# Patient Record
Sex: FEMALE | Race: WHITE | NOT HISPANIC OR LATINO | URBAN - METROPOLITAN AREA
[De-identification: names, ages, dates, MRNs, and addresses within clinical notes are randomized per-mention and may not be internally consistent; named-entity substitution may affect disease eponyms.]

---

## 2019-02-26 PROCEDURE — 99284 EMERGENCY DEPT VISIT MOD MDM: CPT | Mod: 25

## 2019-02-27 ENCOUNTER — OUTPATIENT (OUTPATIENT)
Dept: OUTPATIENT SERVICES | Facility: HOSPITAL | Age: 64
LOS: 1 days | End: 2019-02-27

## 2019-02-27 ENCOUNTER — INPATIENT (INPATIENT)
Facility: HOSPITAL | Age: 64
LOS: 1 days | Discharge: HOME | End: 2019-03-01
Payer: MEDICAID

## 2019-02-27 PROCEDURE — 71045 X-RAY EXAM CHEST 1 VIEW: CPT | Mod: 26

## 2019-02-28 ENCOUNTER — OUTPATIENT (OUTPATIENT)
Dept: OUTPATIENT SERVICES | Facility: HOSPITAL | Age: 64
LOS: 1 days | End: 2019-02-28

## 2019-03-01 ENCOUNTER — OUTPATIENT (OUTPATIENT)
Dept: OUTPATIENT SERVICES | Facility: HOSPITAL | Age: 64
LOS: 1 days | End: 2019-03-01

## 2019-03-01 PROCEDURE — 76770 US EXAM ABDO BACK WALL COMP: CPT | Mod: 26

## 2019-06-19 ENCOUNTER — INPATIENT (INPATIENT)
Facility: HOSPITAL | Age: 64
LOS: 1 days | Discharge: HOME | End: 2019-06-21
Attending: FAMILY MEDICINE
Payer: COMMERCIAL

## 2019-06-19 ENCOUNTER — OUTPATIENT (OUTPATIENT)
Dept: OUTPATIENT SERVICES | Facility: HOSPITAL | Age: 64
LOS: 1 days | End: 2019-06-19

## 2019-06-19 PROCEDURE — 99285 EMERGENCY DEPT VISIT HI MDM: CPT

## 2019-06-20 ENCOUNTER — OUTPATIENT (OUTPATIENT)
Dept: OUTPATIENT SERVICES | Facility: HOSPITAL | Age: 64
LOS: 1 days | End: 2019-06-20

## 2019-06-21 ENCOUNTER — OUTPATIENT (OUTPATIENT)
Dept: OUTPATIENT SERVICES | Facility: HOSPITAL | Age: 64
LOS: 1 days | End: 2019-06-21

## 2019-10-04 ENCOUNTER — EMERGENCY (EMERGENCY)
Facility: HOSPITAL | Age: 64
LOS: 1 days | End: 2019-10-04
Admitting: EMERGENCY MEDICINE
Payer: MEDICAID

## 2019-10-04 PROCEDURE — 99284 EMERGENCY DEPT VISIT MOD MDM: CPT

## 2019-10-07 PROCEDURE — 93010 ELECTROCARDIOGRAM REPORT: CPT

## 2019-10-14 ENCOUNTER — INPATIENT (INPATIENT)
Facility: HOSPITAL | Age: 64
LOS: 1 days | Discharge: PSYCHIATRIC OR SUBSTANCE USE FACILITY | End: 2019-10-16
Payer: MEDICAID

## 2019-10-14 ENCOUNTER — OUTPATIENT (OUTPATIENT)
Dept: OUTPATIENT SERVICES | Facility: HOSPITAL | Age: 64
LOS: 1 days | End: 2019-10-14

## 2019-10-14 PROCEDURE — 71045 X-RAY EXAM CHEST 1 VIEW: CPT | Mod: 26

## 2019-10-14 PROCEDURE — 99285 EMERGENCY DEPT VISIT HI MDM: CPT

## 2019-10-15 ENCOUNTER — OUTPATIENT (OUTPATIENT)
Dept: OUTPATIENT SERVICES | Facility: HOSPITAL | Age: 64
LOS: 1 days | End: 2019-10-15

## 2019-10-15 PROCEDURE — 93010 ELECTROCARDIOGRAM REPORT: CPT

## 2019-10-16 ENCOUNTER — INPATIENT (INPATIENT)
Facility: HOSPITAL | Age: 64
LOS: 22 days | Discharge: HOME | End: 2019-11-08
Attending: PSYCHIATRY & NEUROLOGY | Admitting: PSYCHIATRY & NEUROLOGY
Payer: MEDICAID

## 2019-10-16 ENCOUNTER — OUTPATIENT (OUTPATIENT)
Dept: OUTPATIENT SERVICES | Facility: HOSPITAL | Age: 64
LOS: 1 days | End: 2019-10-16

## 2019-10-16 VITALS — HEIGHT: 59 IN | RESPIRATION RATE: 16 BRPM | WEIGHT: 162.26 LBS | TEMPERATURE: 98 F

## 2019-10-16 DIAGNOSIS — F33.2 MAJOR DEPRESSIVE DISORDER, RECURRENT SEVERE WITHOUT PSYCHOTIC FEATURES: ICD-10-CM

## 2019-10-16 PROCEDURE — 99221 1ST HOSP IP/OBS SF/LOW 40: CPT

## 2019-10-16 RX ORDER — ATORVASTATIN CALCIUM 80 MG/1
20 TABLET, FILM COATED ORAL AT BEDTIME
Refills: 0 | Status: DISCONTINUED | OUTPATIENT
Start: 2019-10-16 | End: 2019-11-08

## 2019-10-16 RX ORDER — INSULIN GLARGINE 100 [IU]/ML
20 INJECTION, SOLUTION SUBCUTANEOUS AT BEDTIME
Refills: 0 | Status: DISCONTINUED | OUTPATIENT
Start: 2019-10-16 | End: 2019-10-18

## 2019-10-16 RX ORDER — INSULIN LISPRO 100/ML
VIAL (ML) SUBCUTANEOUS AT BEDTIME
Refills: 0 | Status: DISCONTINUED | OUTPATIENT
Start: 2019-10-16 | End: 2019-11-08

## 2019-10-16 RX ORDER — VENLAFAXINE HCL 75 MG
37.5 CAPSULE, EXT RELEASE 24 HR ORAL DAILY
Refills: 0 | Status: DISCONTINUED | OUTPATIENT
Start: 2019-10-16 | End: 2019-10-20

## 2019-10-16 RX ORDER — INSULIN GLARGINE 100 [IU]/ML
20 INJECTION, SOLUTION SUBCUTANEOUS AT BEDTIME
Refills: 0 | Status: DISCONTINUED | OUTPATIENT
Start: 2019-10-16 | End: 2019-10-16

## 2019-10-16 RX ORDER — LISINOPRIL 2.5 MG/1
5 TABLET ORAL DAILY
Refills: 0 | Status: DISCONTINUED | OUTPATIENT
Start: 2019-10-16 | End: 2019-10-17

## 2019-10-16 RX ORDER — INSULIN LISPRO 100/ML
VIAL (ML) SUBCUTANEOUS
Refills: 0 | Status: DISCONTINUED | OUTPATIENT
Start: 2019-10-16 | End: 2019-11-08

## 2019-10-16 RX ORDER — VENLAFAXINE HCL 75 MG
37.5 CAPSULE, EXT RELEASE 24 HR ORAL DAILY
Refills: 0 | Status: DISCONTINUED | OUTPATIENT
Start: 2019-10-16 | End: 2019-10-16

## 2019-10-16 RX ORDER — MONTELUKAST 4 MG/1
10 TABLET, CHEWABLE ORAL DAILY
Refills: 0 | Status: DISCONTINUED | OUTPATIENT
Start: 2019-10-16 | End: 2019-11-08

## 2019-10-16 RX ADMIN — INSULIN GLARGINE 20 UNIT(S): 100 INJECTION, SOLUTION SUBCUTANEOUS at 21:18

## 2019-10-16 RX ADMIN — ATORVASTATIN CALCIUM 20 MILLIGRAM(S): 80 TABLET, FILM COATED ORAL at 21:18

## 2019-10-16 RX ADMIN — Medication 2: at 21:18

## 2019-10-17 ENCOUNTER — EMERGENCY (EMERGENCY)
Facility: HOSPITAL | Age: 64
LOS: 1 days | Discharge: HOME | End: 2019-10-17
Attending: EMERGENCY MEDICINE | Admitting: EMERGENCY MEDICINE
Payer: MEDICAID

## 2019-10-17 VITALS
HEART RATE: 85 BPM | SYSTOLIC BLOOD PRESSURE: 124 MMHG | TEMPERATURE: 98 F | RESPIRATION RATE: 16 BRPM | DIASTOLIC BLOOD PRESSURE: 84 MMHG | OXYGEN SATURATION: 100 %

## 2019-10-17 LAB
ALBUMIN SERPL ELPH-MCNC: 3.4 G/DL — SIGNIFICANT CHANGE UP (ref 3.3–5)
ALP SERPL-CCNC: 106 U/L — SIGNIFICANT CHANGE UP (ref 40–120)
ALT FLD-CCNC: 11 U/L — SIGNIFICANT CHANGE UP (ref 4–33)
ANION GAP SERPL CALC-SCNC: 11 MMO/L — SIGNIFICANT CHANGE UP (ref 7–14)
AST SERPL-CCNC: 23 U/L — SIGNIFICANT CHANGE UP (ref 4–32)
BASOPHILS # BLD AUTO: 0.09 K/UL — SIGNIFICANT CHANGE UP (ref 0–0.2)
BASOPHILS NFR BLD AUTO: 0.7 % — SIGNIFICANT CHANGE UP (ref 0–2)
BILIRUB SERPL-MCNC: < 0.2 MG/DL — LOW (ref 0.2–1.2)
BUN SERPL-MCNC: 27 MG/DL — HIGH (ref 7–23)
CALCIUM SERPL-MCNC: 9 MG/DL — SIGNIFICANT CHANGE UP (ref 8.4–10.5)
CHLORIDE SERPL-SCNC: 103 MMOL/L — SIGNIFICANT CHANGE UP (ref 98–107)
CO2 SERPL-SCNC: 20 MMOL/L — LOW (ref 22–31)
CREAT SERPL-MCNC: 1.74 MG/DL — HIGH (ref 0.5–1.3)
EOSINOPHIL # BLD AUTO: 0.15 K/UL — SIGNIFICANT CHANGE UP (ref 0–0.5)
EOSINOPHIL NFR BLD AUTO: 1.2 % — SIGNIFICANT CHANGE UP (ref 0–6)
FOLATE SERPL-MCNC: 6.1 NG/ML — SIGNIFICANT CHANGE UP (ref 4.7–20)
GLUCOSE SERPL-MCNC: 339 MG/DL — HIGH (ref 70–99)
HBA1C BLD-MCNC: 15.9 % — HIGH (ref 4–5.6)
HCT VFR BLD CALC: 36.9 % — SIGNIFICANT CHANGE UP (ref 34.5–45)
HGB BLD-MCNC: 11.9 G/DL — SIGNIFICANT CHANGE UP (ref 11.5–15.5)
IMM GRANULOCYTES NFR BLD AUTO: 0.6 % — SIGNIFICANT CHANGE UP (ref 0–1.5)
LYMPHOCYTES # BLD AUTO: 2.51 K/UL — SIGNIFICANT CHANGE UP (ref 1–3.3)
LYMPHOCYTES # BLD AUTO: 20.7 % — SIGNIFICANT CHANGE UP (ref 13–44)
MCHC RBC-ENTMCNC: 29.7 PG — SIGNIFICANT CHANGE UP (ref 27–34)
MCHC RBC-ENTMCNC: 32.2 % — SIGNIFICANT CHANGE UP (ref 32–36)
MCV RBC AUTO: 92 FL — SIGNIFICANT CHANGE UP (ref 80–100)
MONOCYTES # BLD AUTO: 0.55 K/UL — SIGNIFICANT CHANGE UP (ref 0–0.9)
MONOCYTES NFR BLD AUTO: 4.5 % — SIGNIFICANT CHANGE UP (ref 2–14)
NEUTROPHILS # BLD AUTO: 8.76 K/UL — HIGH (ref 1.8–7.4)
NEUTROPHILS NFR BLD AUTO: 72.3 % — SIGNIFICANT CHANGE UP (ref 43–77)
NRBC # FLD: 0 K/UL — SIGNIFICANT CHANGE UP (ref 0–0)
PLATELET # BLD AUTO: 390 K/UL — SIGNIFICANT CHANGE UP (ref 150–400)
PMV BLD: 9.1 FL — SIGNIFICANT CHANGE UP (ref 7–13)
POTASSIUM SERPL-MCNC: 4.8 MMOL/L — SIGNIFICANT CHANGE UP (ref 3.5–5.3)
POTASSIUM SERPL-SCNC: 4.8 MMOL/L — SIGNIFICANT CHANGE UP (ref 3.5–5.3)
PROT SERPL-MCNC: 7.7 G/DL — SIGNIFICANT CHANGE UP (ref 6–8.3)
RBC # BLD: 4.01 M/UL — SIGNIFICANT CHANGE UP (ref 3.8–5.2)
RBC # FLD: 15 % — HIGH (ref 10.3–14.5)
SODIUM SERPL-SCNC: 134 MMOL/L — LOW (ref 135–145)
TSH SERPL-MCNC: 1.82 UIU/ML — SIGNIFICANT CHANGE UP (ref 0.27–4.2)
VIT B12 SERPL-MCNC: 806 PG/ML — SIGNIFICANT CHANGE UP (ref 200–900)
WBC # BLD: 12.13 K/UL — HIGH (ref 3.8–10.5)
WBC # FLD AUTO: 12.13 K/UL — HIGH (ref 3.8–10.5)

## 2019-10-17 PROCEDURE — 93010 ELECTROCARDIOGRAM REPORT: CPT

## 2019-10-17 PROCEDURE — 99283 EMERGENCY DEPT VISIT LOW MDM: CPT | Mod: 25

## 2019-10-17 PROCEDURE — 99223 1ST HOSP IP/OBS HIGH 75: CPT

## 2019-10-17 PROCEDURE — 99232 SBSQ HOSP IP/OBS MODERATE 35: CPT

## 2019-10-17 RX ORDER — DEXTROSE 50 % IN WATER 50 %
25 SYRINGE (ML) INTRAVENOUS ONCE
Refills: 0 | Status: DISCONTINUED | OUTPATIENT
Start: 2019-10-17 | End: 2019-11-08

## 2019-10-17 RX ORDER — INSULIN LISPRO 100/ML
6 VIAL (ML) SUBCUTANEOUS
Refills: 0 | Status: DISCONTINUED | OUTPATIENT
Start: 2019-10-17 | End: 2019-10-18

## 2019-10-17 RX ORDER — INSULIN LISPRO 100/ML
6 VIAL (ML) SUBCUTANEOUS ONCE
Refills: 0 | Status: COMPLETED | OUTPATIENT
Start: 2019-10-17 | End: 2019-10-17

## 2019-10-17 RX ORDER — GLUCAGON INJECTION, SOLUTION 0.5 MG/.1ML
1 INJECTION, SOLUTION SUBCUTANEOUS ONCE
Refills: 0 | Status: DISCONTINUED | OUTPATIENT
Start: 2019-10-17 | End: 2019-11-08

## 2019-10-17 RX ORDER — SODIUM CHLORIDE 9 MG/ML
1000 INJECTION INTRAMUSCULAR; INTRAVENOUS; SUBCUTANEOUS ONCE
Refills: 0 | Status: COMPLETED | OUTPATIENT
Start: 2019-10-17 | End: 2019-10-17

## 2019-10-17 RX ADMIN — Medication 37.5 MILLIGRAM(S): at 09:48

## 2019-10-17 RX ADMIN — LISINOPRIL 5 MILLIGRAM(S): 2.5 TABLET ORAL at 09:47

## 2019-10-17 RX ADMIN — MONTELUKAST 10 MILLIGRAM(S): 4 TABLET, CHEWABLE ORAL at 09:48

## 2019-10-17 RX ADMIN — Medication 10: at 16:56

## 2019-10-17 RX ADMIN — Medication 4: at 12:30

## 2019-10-17 RX ADMIN — ATORVASTATIN CALCIUM 20 MILLIGRAM(S): 80 TABLET, FILM COATED ORAL at 20:03

## 2019-10-17 RX ADMIN — Medication 6 UNIT(S): at 16:57

## 2019-10-17 NOTE — ED PROVIDER NOTE - PATIENT PORTAL LINK FT
You can access the FollowMyHealth Patient Portal offered by [facility] by registering at the following website: [email or web address]. By joining [employer]’s FollowMyHealth portal, you will also be able to view your health information using other applications (apps) compatible with our system.

## 2019-10-17 NOTE — CHART NOTE - NSCHARTNOTEFT_GEN_A_CORE
[facility] Inpatient to ED Transfer Summary    Reason for Transfer/Medical Summary: ELEVATED  WITH CHANGE IN MENTAL STATUS  Called to evaluate pt who is a 65 yo woman with PMHx of depression, Type 2 Diabetes, HTN, HLD, CKD stage 3, who was noted to have an elevated FS of 512 and noted to be "sluggish". As per RN, earlier today patient was at baseline, as day progressed she was noted to be sluggish (drooling, eyes closing while speaking, unsteady gait). Pts last FS of 399 was treated with ISS (10units) and pre-meal Humalog (6units). Pt seen and examined. Pt reports having elevated FS in past and admits to noncompliance with medications at home. She is A&Ox2 (person & place) at baseline and during this encounter. As per chart review, patient with history of elevated FS (march 2019) and most recently prior to [facility] admission, pt treated at [facility] for elevated BG of 700's which was treated with 3-4L of IVF and IV Insulin- once medically stable she was cleared for transfer. Patient was seen and medications were adjusted- Pre-Meal insulin added only today, receiving only one dose. Patient denies chills, HA, SOB, CP, abdominal pain, N/V, diarrhea, or urinary symptoms (polyuria, urgency, blood in urine, vaginal discharge).     ANNIE made aware of patient transfer to [facility] ED.     Discussed patient transfer with ED Triage Nurse Keke Aggarwal at [facility] ED.    If any additional questions please feel free to call unit @ [phone] OR the Medicine PA, Nikkie Jacobs pager [phone].      PAST MEDICAL & SURGICAL HISTORY:  Type 2 Diabetes  HTN  HLD  CKD stage 3    ALLERGIES  penicillin (Unknown)    INTOLERANCES    SOCIAL Hx:  Denies any ETOH or recreational drug use  Smokes anywhere from 1-2 packs of cigarettes a day, since the age of 14.   Lives with her sister    MEDICATIONS  (STANDING):  atorvastatin 20 milliGRAM(s) Oral at bedtime  dextrose 50% Injectable 25 Gram(s) IV Push once  insulin glargine Injectable (LANTUS) 20 Unit(s) SubCutaneous at bedtime  insulin lispro (HumaLOG) corrective regimen sliding scale   SubCutaneous three times a day before meals  insulin lispro (HumaLOG) corrective regimen sliding scale   SubCutaneous at bedtime  insulin lispro Injectable (HumaLOG) 6 Unit(s) SubCutaneous three times a day before meals  montelukast 10 milliGRAM(s) Oral daily  venlafaxine XR 37.5 milliGRAM(s) Oral daily    MEDICATIONS  (PRN):  glucagon  Injectable 1 milliGRAM(s) IntraMuscular once PRN Glucose LESS THAN 70 milligrams/deciliter  LORazepam     Tablet 1 milliGRAM(s) Oral every 6 hours PRN Agitation  LORazepam   Injectable 1 milliGRAM(s) IntraMuscular every 6 hours PRN Agitation      Vital Signs Last 24 Hrs  T(C): 36.9 (17 Oct 2019 20:17), Max: 36.9 (17 Oct 2019 20:17)  T(F): 98.4 (17 Oct 2019 20:17), Max: 98.4 (17 Oct 2019 20:17)  HR: 94 (17 Oct 2019 20:17) (94 - 94)  BP: 153/73 (17 Oct 2019 20:17) (153/73 - 153/73)  RR: 20 (17 Oct 2019 20:17) (19 - 20)  CAPILLARY BLOOD GLUCOSE    POCT Blood Glucose.: 491 mg/dL (17 Oct 2019 20:12)  POCT Blood Glucose.: 513 mg/dL (17 Oct 2019 20:09)  POCT Blood Glucose.: 399 mg/dL (17 Oct 2019 16:13)  POCT Blood Glucose.: 248 mg/dL (17 Oct 2019 12:18)  POCT Blood Glucose.: 90 mg/dL (17 Oct 2019 08:49)  POCT Blood Glucose.: 267 mg/dL (16 Oct 2019 20:56)      PHYSICAL EXAM:  GENERAL: overweight elderley woman, NAD, well-developed, well-nourished, sitting comfortably with walker at side in day room with PCA next to her  HEAD:  Atraumatic, Normocephalic  EYES: EOMI, PERRLA, conjunctiva and sclera clear  NECK: Supple, No JVD  CHEST/LUNG: Clear to auscultation on right lung field, crackles heard on left lung field; No wheezes or rhonchi  HEART: Regular rate and rhythm; +s1 and +s2, No murmurs, rubs, or gallops  ABDOMEN: Soft, Nontender, Nondistended; normoactive bowel sounds present throughout  EXTREMITIES:  2+ Peripheral Pulses, No clubbing, cyanosis, or edema  PSYCH: AAOx2 (person, place); sluggish but responsive with questions  NEUROLOGY: non-focal  SKIN: No rashes or lesions    LABS:                        11.9   12.13 )-----------( 390      ( 17 Oct 2019 14:30 )             36.9     10-17    134<L>  |  103  |  27<H>  ----------------------------<  339<H>  4.8   |  20<L>  |  1.74<H>    Ca    9.0      17 Oct 2019 14:30    TPro  7.7  /  Alb  3.4  /  TBili  < 0.2<L>  /  DBili  x   /  AST  23  /  ALT  11  /  AlkPhos  106  10-17      Psychiatry Section:  Psychiatric Summary/[facility] admitting diagnosis: Severe episode of recurrent major depressive disorder, without psychotic features    Psychiatric Recommendations: continue medications as ordered     Observation status (check one):   (X) Constant Observation  ( ) Enhanced care  ( ) Routine checks    Risk Status (check all that apply if present):  ( ) at risk for suicide/self-injury  ( ) at risk for aggressive behavior  (X) at risk for elopement  ( ) other risk: [facility] Inpatient to ED Transfer Summary    Reason for Transfer/Medical Summary: ELEVATED  WITH CHANGE IN MENTAL STATUS  Called to evaluate pt who is a 63 yo woman with PMHx of depression, Type 2 Diabetes, HTN, HLD, CKD stage 3, who was noted to have an elevated FS of 512 and noted to be "sluggish". As per RN, earlier today patient was at baseline, as day progressed she was noted to be sluggish (drooling, eyes closing while speaking, unsteady gait). Pts last FS of 399 was treated with ISS (10units) and pre-meal Humalog (6units). Pt seen and examined. Pt reports having elevated FS in past and admits to noncompliance with medications at home. She is A&Ox2 (person & place) at baseline and during this encounter. As per chart review, patient with history of elevated FS (march 2019) and most recently prior to [facility] admission, pt treated at [facility] for elevated BG of 700's which was treated with 3-4L of IVF and IV Insulin- once medically stable she was cleared for transfer. Patient was seen by hospitalist and medications were adjusted- Pre-Meal insulin added only today, receiving only one dose. Discussed case with Hospitalists, who agree with current plan to transfer patient to ED in setting of change in mental status. Patient denies chills, HA, SOB, CP, abdominal pain, N/V, diarrhea, or urinary symptoms (polyuria, urgency, blood in urine, vaginal discharge).     ANNIE made aware of patient transfer to [facility] ED.     Discussed patient transfer with ED Triage Nurse Keke Aggarwal at [facility] ED.    If any additional questions please feel free to call unit @ [phone] OR the Medicine PA, Nikkie Jacobs pager [phone].      PAST MEDICAL & SURGICAL HISTORY:  Type 2 Diabetes  HTN  HLD  CKD stage 3    ALLERGIES  penicillin (Unknown)    INTOLERANCES    SOCIAL Hx:  Denies any ETOH or recreational drug use  Smokes anywhere from 1-2 packs of cigarettes a day, since the age of 14.   Lives with her sister    MEDICATIONS  (STANDING):  atorvastatin 20 milliGRAM(s) Oral at bedtime  dextrose 50% Injectable 25 Gram(s) IV Push once  insulin glargine Injectable (LANTUS) 20 Unit(s) SubCutaneous at bedtime  insulin lispro (HumaLOG) corrective regimen sliding scale   SubCutaneous three times a day before meals  insulin lispro (HumaLOG) corrective regimen sliding scale   SubCutaneous at bedtime  insulin lispro Injectable (HumaLOG) 6 Unit(s) SubCutaneous three times a day before meals  montelukast 10 milliGRAM(s) Oral daily  venlafaxine XR 37.5 milliGRAM(s) Oral daily    MEDICATIONS  (PRN):  glucagon  Injectable 1 milliGRAM(s) IntraMuscular once PRN Glucose LESS THAN 70 milligrams/deciliter  LORazepam     Tablet 1 milliGRAM(s) Oral every 6 hours PRN Agitation  LORazepam   Injectable 1 milliGRAM(s) IntraMuscular every 6 hours PRN Agitation      Vital Signs Last 24 Hrs  T(C): 36.9 (17 Oct 2019 20:17), Max: 36.9 (17 Oct 2019 20:17)  T(F): 98.4 (17 Oct 2019 20:17), Max: 98.4 (17 Oct 2019 20:17)  HR: 94 (17 Oct 2019 20:17) (94 - 94)  BP: 153/73 (17 Oct 2019 20:17) (153/73 - 153/73)  RR: 20 (17 Oct 2019 20:17) (19 - 20)  CAPILLARY BLOOD GLUCOSE    POCT Blood Glucose.: 491 mg/dL (17 Oct 2019 20:12)  POCT Blood Glucose.: 513 mg/dL (17 Oct 2019 20:09)  POCT Blood Glucose.: 399 mg/dL (17 Oct 2019 16:13)  POCT Blood Glucose.: 248 mg/dL (17 Oct 2019 12:18)  POCT Blood Glucose.: 90 mg/dL (17 Oct 2019 08:49)  POCT Blood Glucose.: 267 mg/dL (16 Oct 2019 20:56)      PHYSICAL EXAM:  GENERAL: overweight elder[place] woman, NAD, well-developed, well-nourished, sitting comfortably with walker at side in day room with PCA next to her  HEAD:  Atraumatic, Normocephalic  EYES: EOMI, PERRLA, conjunctiva and sclera clear  NECK: Supple, No JVD  CHEST/LUNG: Clear to auscultation on right lung field, crackles heard on left lung field; No wheezes or rhonchi  HEART: Regular rate and rhythm; +s1 and +s2, No murmurs, rubs, or gallops  ABDOMEN: Soft, Nontender, Nondistended; normoactive bowel sounds present throughout  EXTREMITIES:  2+ Peripheral Pulses, No clubbing, cyanosis, or edema  PSYCH: AAOx2 (person, place); sluggish but responsive with questions  NEUROLOGY: non-focal  SKIN: No rashes or lesions    LABS:                        11.9   12.13 )-----------( 390      ( 17 Oct 2019 14:30 )             36.9     10-17    134<L>  |  103  |  27<H>  ----------------------------<  339<H>  4.8   |  20<L>  |  1.74<H>    Ca    9.0      17 Oct 2019 14:30    TPro  7.7  /  Alb  3.4  /  TBili  < 0.2<L>  /  DBili  x   /  AST  23  /  ALT  11  /  AlkPhos  106  10-17      Psychiatry Section:  Psychiatric Summary/[facility] admitting diagnosis: Severe episode of recurrent major depressive disorder, without psychotic features    Psychiatric Recommendations: continue medications as ordered     Observation status (check one):   (X) Constant Observation  ( ) Enhanced care  ( ) Routine checks    Risk Status (check all that apply if present):  ( ) at risk for suicide/self-injury  ( ) at risk for aggressive behavior  (X) at risk for elopement  ( ) other risk:

## 2019-10-17 NOTE — ED PROVIDER NOTE - PHYSICAL EXAMINATION
GEN - NAD; well appearing; A+O x3   HEAD - NC/AT   EYES- PERRL, EOMI  ENT: Airway patent, mmm, Oral cavity and pharynx normal.   NECK: Neck supple  PULMONARY - CTA b/l, symmetric breath sounds.   CARDIAC -s1s2, RRR, no M,G,R  ABDOMEN - +BS, ND, NT, soft, no guarding, no rebound, no masses   BACK - no CVA tenderness, Normal  spine   EXTREMITIES - FROM, no edema   SKIN - no rash  NEUROLOGIC - alert, speech clear, no focal deficits  PSYCH -calm, cooperative

## 2019-10-17 NOTE — ED ADULT NURSE NOTE - OBJECTIVE STATEMENT
Pt received in room 16, pt arrives from inpatient [facility] (inpatient), aide at bedside, for hyperglycemia, , pt not given 40U of nighttime Lantus.

## 2019-10-17 NOTE — ED CLERICAL - NS ED CLERK NOTE PRE-ARRIVAL INFORMATION; ADDITIONAL PRE-ARRIVAL INFORMATION
, pt lethargic. Hx HTN, DM.  Meds- Lantus 20U qhs, Humalog 6U premeals with sliding scale correction.

## 2019-10-17 NOTE — ED PROVIDER NOTE - CLINICAL SUMMARY MEDICAL DECISION MAKING FREE TEXT BOX
Patient presents to ed with hyperglycemia. Labs reviewed from this afternoon w/o e/o dka. Fsgs got progressively higher and patient was noted to be sluggish so was sent to ed. Patient now endorsing resolution of her symptoms. Feels back to baseline. FSG elevated in triage. Will administer fluids and insulin, check labs to eval for met disturbance, ua to eval for e/o infection, monitor, reass.

## 2019-10-17 NOTE — ED PROVIDER NOTE - PROGRESS NOTE DETAILS
Shantell Garland MD: Pt not in DKA. Mildly hyponatremia, will treat with IVF normal saline. BG in 300's, given 6U humalog and 20U bedtime lantus. Will transfer back to [facility]. Shantell Garland MD: Spoke to [facility]i GUILLORY, who is aware of transfer back to [facility]

## 2019-10-17 NOTE — ED PROVIDER NOTE - NS ED ROS FT
ROS:  GENERAL: No fever, no chills, +fatigue earlier now resolved  EYES: no change in vision  HEENT: no trouble swallowing, no trouble speaking  CARDIAC: no chest pain  PULMONARY: no cough, no shortness of breath  GI: no abdominal pain, no nausea, no vomiting, no diarrhea, no constipation  : No dysuria, no frequency, no change in appearance, or odor of urine  SKIN: no rashes  NEURO: no headache, no weakness  MSK: No joint pain

## 2019-10-17 NOTE — ED PROVIDER NOTE - OBJECTIVE STATEMENT
63 y/o f presents from [facility] for hyperglycemia. Patient states earlier today she felt fatigued. Was found to have elevated fsgs so sent to ed. Denies any fevers, ha, cp, cough, sob, abd pain, vomiting, diarrhea, dysuria, focal weakness, numbness. Per chart review-today had premeal humalog added to her regular lantus. Feels back to her baseline at this time.

## 2019-10-17 NOTE — ED ADULT NURSE NOTE - NSIMPLEMENTINTERV_GEN_ALL_ED
Implemented All Fall Risk Interventions:  [place] to call system. Call bell, personal items and telephone within reach. Instruct patient to call for assistance. Room bathroom lighting operational. Non-slip footwear when patient is off stretcher. Physically safe environment: no spills, clutter or unnecessary equipment. Stretcher in lowest position, wheels locked, appropriate side rails in place. Provide visual cue, wrist band, yellow gown, etc. Monitor gait and stability. Monitor for mental status changes and reorient to person, place, and time. Review medications for side effects contributing to fall risk. Reinforce activity limits and safety measures with patient and family.

## 2019-10-17 NOTE — CONSULT NOTE ADULT - SUBJECTIVE AND OBJECTIVE BOX
HPI:     PAST MEDICAL & SURGICAL HISTORY:      Review of Systems:   CONSTITUTIONAL: No fever, weight loss, or fatigue  EYES: No eye pain, visual disturbances, or discharge  ENMT:  No difficulty hearing, tinnitus, vertigo; No sinus or throat pain  NECK: No pain or stiffness  RESPIRATORY: No cough, wheezing, chills or hemoptysis; No shortness of breath  CARDIOVASCULAR: No chest pain, palpitations, dizziness, or leg swelling  GASTROINTESTINAL: No abdominal or epigastric pain. No nausea, vomiting, or hematemesis; No diarrhea or constipation. No melena or hematochezia.  GENITOURINARY: No dysuria, frequency, hematuria, or incontinence  NEUROLOGICAL: No headaches, memory loss, loss of strength, numbness, or tremors  SKIN: No itching, burning, rashes, or lesions   LYMPH NODES: No enlarged glands  ENDOCRINE: No heat or cold intolerance; No hair loss  MUSCULOSKELETAL: No joint pain or swelling; No muscle, back, or extremity pain  HEME/LYMPH: No easy bruising, or bleeding gums  ALLERY AND IMMUNOLOGIC: No hives or eczema    Allergies    penicillin (Unknown)    Intolerances        Social History:     FAMILY HISTORY:      MEDICATIONS  (STANDING):  atorvastatin 20 milliGRAM(s) Oral at bedtime  insulin glargine Injectable (LANTUS) 20 Unit(s) SubCutaneous at bedtime  insulin lispro (HumaLOG) corrective regimen sliding scale   SubCutaneous three times a day before meals  insulin lispro (HumaLOG) corrective regimen sliding scale   SubCutaneous at bedtime  lisinopril 5 milliGRAM(s) Oral daily  montelukast 10 milliGRAM(s) Oral daily  venlafaxine XR 37.5 milliGRAM(s) Oral daily    MEDICATIONS  (PRN):  LORazepam     Tablet 1 milliGRAM(s) Oral every 6 hours PRN Agitation  LORazepam   Injectable 1 milliGRAM(s) IntraMuscular every 6 hours PRN Agitation      Vital Signs Last 24 Hrs  T(C): 36.2 (17 Oct 2019 09:22), Max: 36.8 (16 Oct 2019 20:19)  T(F): 97.2 (17 Oct 2019 09:22), Max: 98.2 (16 Oct 2019 20:19)  HR: --  BP: --  BP(mean): --  RR: 19 (17 Oct 2019 09:22) (16 - 19)  SpO2: --  CAPILLARY BLOOD GLUCOSE      POCT Blood Glucose.: 90 mg/dL (17 Oct 2019 08:49)  POCT Blood Glucose.: 267 mg/dL (16 Oct 2019 20:56)        PHYSICAL EXAM:  GENERAL: NAD, well-developed  HEAD:  Atraumatic, Normocephalic  EYES: EOMI, conjunctiva and sclera clear  NECK: Supple, No JVD  CHEST/LUNG: Clear to auscultation bilaterally; No wheeze  HEART: Regular rate and rhythm; No murmurs, rubs, or gallops  ABDOMEN: Soft, Nontender, Nondistended; Bowel sounds present  EXTREMITIES:  2+ Peripheral Pulses, No clubbing, cyanosis, or edema  NEUROLOGY: non-focal  SKIN: No rashes or lesions    LABS:                    EKG(personally reviewed):    RADIOLOGY & ADDITIONAL TESTS:    Imaging Personally Reviewed:    Consultant(s) Notes Reviewed:      Care Discussed with Consultants/Other Providers: HPI: 63 yo woman with history of depression, Type 2 Diabetes, HTN, HLD, CKD stage 3, initially presented to [place] with elevated fingerstick. She was found to have FS in the 400s, no ketones,  admitted with [facility]. She was started on glargine 20 units with a sliding scale with improvement in her fingersticks (120s-150s). This am, patient was examined. Denies any abdominal pain, chest pain, SOB. She has a primary care doctor, Dr. Andrews, who she last saw 6 months ago. She is unsure of her diabetic regimen. Reports intermittent compliance.     PAST MEDICAL & SURGICAL HISTORY:  depression, Type 2 Diabetes, HTN, HLD, CKD stage 3,    Review of Systems:   CONSTITUTIONAL: No fever, weight loss, or fatigue  EYES: No eye pain, visual disturbances, or discharge  ENMT:  No difficulty hearing, tinnitus, vertigo; No sinus or throat pain  NECK: No pain or stiffness  RESPIRATORY: No cough, wheezing, chills or hemoptysis; No shortness of breath  CARDIOVASCULAR: No chest pain, palpitations, dizziness, or leg swelling  GASTROINTESTINAL: No abdominal or epigastric pain. No nausea, vomiting, or hematemesis; No diarrhea or constipation. No melena or hematochezia.  GENITOURINARY: No dysuria, frequency, hematuria, or incontinence  NEUROLOGICAL: No headaches, memory loss, loss of strength, numbness, or tremors  SKIN: No itching, burning, rashes, or lesions   LYMPH NODES: No enlarged glands  ENDOCRINE: No heat or cold intolerance; No hair loss  MUSCULOSKELETAL: No joint pain or swelling; No muscle, back, or extremity pain  HEME/LYMPH: No easy bruising, or bleeding gums  ALLERY AND IMMUNOLOGIC: No hives or eczema    Allergies    penicillin (Unknown)    Intolerances        Social History: Lives with her sister. Denies any ETOH or recreational drug use. Smokes anywhere from 1-2 packs of cigarettes a day, since the age of 14.     FAMILY HISTORY: No relevant FH.       MEDICATIONS  (STANDING):  atorvastatin 20 milliGRAM(s) Oral at bedtime  insulin glargine Injectable (LANTUS) 20 Unit(s) SubCutaneous at bedtime  insulin lispro (HumaLOG) corrective regimen sliding scale   SubCutaneous three times a day before meals  insulin lispro (HumaLOG) corrective regimen sliding scale   SubCutaneous at bedtime  lisinopril 5 milliGRAM(s) Oral daily  montelukast 10 milliGRAM(s) Oral daily  venlafaxine XR 37.5 milliGRAM(s) Oral daily    MEDICATIONS  (PRN):  LORazepam     Tablet 1 milliGRAM(s) Oral every 6 hours PRN Agitation  LORazepam   Injectable 1 milliGRAM(s) IntraMuscular every 6 hours PRN Agitation      Vital Signs Last 24 Hrs  T(C): 36.2 (17 Oct 2019 09:22), Max: 36.8 (16 Oct 2019 20:19)  T(F): 97.2 (17 Oct 2019 09:22), Max: 98.2 (16 Oct 2019 20:19)  HR: --  BP: -- 128/82  BP(mean): --  RR: 19 (17 Oct 2019 09:22) (16 - 19)  SpO2: --  CAPILLARY BLOOD GLUCOSE      POCT Blood Glucose.: 90 mg/dL (17 Oct 2019 08:49)  POCT Blood Glucose.: 267 mg/dL (16 Oct 2019 20:56)        PHYSICAL EXAM:  GENERAL: Overweight elderly woman in  NAD, well-developed  HEAD:  Atraumatic, Normocephalic  EYES: EOMI, conjunctiva and sclera clear  NECK: Supple, No JVD  CHEST/LUNG: Clear to auscultation bilaterally; No wheeze  HEART: Regular rate and rhythm; No murmurs, rubs, or gallops  ABDOMEN: Soft, Nontender, Nondistended; Bowel sounds present  EXTREMITIES:  2+ Peripheral Pulses, No clubbing, cyanosis, or edema  NEUROLOGY: non-focal  SKIN: No rashes or lesions    LABS:  LABS:  CAPILLARY BLOOD GLUCOSE      POCT Blood Glucose.: 248 mg/dL (17 Oct 2019 12:18)  POCT Blood Glucose.: 90 mg/dL (17 Oct 2019 08:49)  POCT Blood Glucose.: 267 mg/dL (16 Oct 2019 20:56)          Hemoglobin A1C, Whole Blood (10.17.19 @ 09:15)    Hemoglobin A1C, Whole Blood: 15.9: High Risk (prediabetic)    5.7 - 6.4 %  Diabetic, diagnostic           > 6.5 %  ADA diabetic treatment goal    < 7.0 %    HbA1C values may not accurately reflect mean blood glucose  in patients with Hb variants.  Suggest clinical correlation. %                          EKG(personally reviewed):    RADIOLOGY & ADDITIONAL TESTS:    Imaging Personally Reviewed:    Consultant(s) Notes Reviewed:      Care Discussed with Consultants/Other Providers: HPI: 63 yo woman with history of depression, Type 2 Diabetes, HTN, HLD, CKD stage 3, initially presented to [place] with elevated fingerstick. She was found to have FS in the 400s, no ketones,  admitted with [facility]. She was started on glargine 20 units with a sliding scale with improvement in her fingersticks (120s-150s). This am, patient was examined. Denies any abdominal pain, chest pain, SOB. She has a primary care doctor, Dr. Andrews, who she last saw 6 months ago. She is unsure of her diabetic regimen. Reports intermittent compliance.     PAST MEDICAL & SURGICAL HISTORY:  depression, Type 2 Diabetes, HTN, HLD, CKD stage 3,    Review of Systems:   CONSTITUTIONAL: No fever, weight loss, or fatigue  EYES: No eye pain, visual disturbances, or discharge  ENMT:  No difficulty hearing, tinnitus, vertigo; No sinus or throat pain  NECK: No pain or stiffness  RESPIRATORY: No cough, wheezing, chills or hemoptysis; No shortness of breath  CARDIOVASCULAR: No chest pain, palpitations, dizziness, or leg swelling  GASTROINTESTINAL: No abdominal or epigastric pain. No nausea, vomiting, or hematemesis; No diarrhea or constipation. No melena or hematochezia.  GENITOURINARY: No dysuria, frequency, hematuria, or incontinence  NEUROLOGICAL: No headaches, memory loss, loss of strength, numbness, or tremors  SKIN: No itching, burning, rashes, or lesions   LYMPH NODES: No enlarged glands  ENDOCRINE: No heat or cold intolerance; No hair loss  MUSCULOSKELETAL: No joint pain or swelling; No muscle, back, or extremity pain  HEME/LYMPH: No easy bruising, or bleeding gums  ALLERY AND IMMUNOLOGIC: No hives or eczema    Allergies    penicillin (Unknown)    Intolerances        Social History: Lives with her sister. Denies any ETOH or recreational drug use. Smokes anywhere from 1-2 packs of cigarettes a day, since the age of 14.     FAMILY HISTORY: No relevant FH.       MEDICATIONS  (STANDING):  atorvastatin 20 milliGRAM(s) Oral at bedtime  insulin glargine Injectable (LANTUS) 20 Unit(s) SubCutaneous at bedtime  insulin lispro (HumaLOG) corrective regimen sliding scale   SubCutaneous three times a day before meals  insulin lispro (HumaLOG) corrective regimen sliding scale   SubCutaneous at bedtime  lisinopril 5 milliGRAM(s) Oral daily  montelukast 10 milliGRAM(s) Oral daily  venlafaxine XR 37.5 milliGRAM(s) Oral daily    MEDICATIONS  (PRN):  LORazepam     Tablet 1 milliGRAM(s) Oral every 6 hours PRN Agitation  LORazepam   Injectable 1 milliGRAM(s) IntraMuscular every 6 hours PRN Agitation      Vital Signs Last 24 Hrs  T(C): 36.2 (17 Oct 2019 09:22), Max: 36.8 (16 Oct 2019 20:19)  T(F): 97.2 (17 Oct 2019 09:22), Max: 98.2 (16 Oct 2019 20:19)  HR: --  BP: -- 128/82  BP(mean): --  RR: 19 (17 Oct 2019 09:22) (16 - 19)  SpO2: --  CAPILLARY BLOOD GLUCOSE      POCT Blood Glucose.: 90 mg/dL (17 Oct 2019 08:49)  POCT Blood Glucose.: 267 mg/dL (16 Oct 2019 20:56)        PHYSICAL EXAM:  GENERAL: Overweight elderly woman in  NAD, well-developed  HEAD:  Atraumatic, Normocephalic  EYES: EOMI, conjunctiva and sclera clear  NECK: Supple, No JVD  CHEST/LUNG: Clear to auscultation bilaterally; No wheeze  HEART: Regular rate and rhythm; No murmurs, rubs, or gallops  ABDOMEN: Soft, Nontender, Nondistended; Bowel sounds present  EXTREMITIES:  2+ Peripheral Pulses, No clubbing, cyanosis, or edema  NEUROLOGY: non-focal  SKIN: No rashes or lesions    LABS:  LABS:  CAPILLARY BLOOD GLUCOSE      POCT Blood Glucose.: 248 mg/dL (17 Oct 2019 12:18)  POCT Blood Glucose.: 90 mg/dL (17 Oct 2019 08:49)  POCT Blood Glucose.: 267 mg/dL (16 Oct 2019 20:56)          Hemoglobin A1C, Whole Blood (10.17.19 @ 09:15)    Hemoglobin A1C, Whole Blood: 15.9: High Risk (prediabetic)    5.7 - 6.4 %  Diabetic, diagnostic           > 6.5 %  ADA diabetic treatment goal    < 7.0 %    HbA1C values may not accurately reflect mean blood glucose  in patients with Hb variants.  Suggest clinical correlation. %                          EKG(personally reviewed):    RADIOLOGY & ADDITIONAL TESTS:    Imaging Personally Reviewed:    Consultant(s) Notes Reviewed:      Care Discussed with Consultants/Other Providers: Dr Mckeon - re: diabetic regimen recommendations

## 2019-10-17 NOTE — CONSULT NOTE ADULT - ASSESSMENT
#DM type 2 poorly controlled   - Continue Glargine 20 units QHS  - Medium dose sliding scale 63 yo woman with history of depression, Type 2 Diabetes, HTN, HLD, CKD stage 3, initially presented to [place] with elevated fingerstick. She was found to have FS in the 400s, no ketones,  admitted with [facility].     #DM type 2 poorly controlled - A1c of 15.9   - Continue Glargine 20 units QHS  - Will start Medium dose sliding scale      #HTN - well controlled  - continue lisinopril 5     #HLD   - Continue atorvastatin    #Depression   - Per psychiatry 65 yo woman with history of depression, Type 2 Diabetes, HTN, HLD, CKD stage 3, initially presented to [place] with elevated fingerstick. She was found to have FS in the 400s, no ketones,  admitted with [facility], found to have depression transferred to [facility] for further management. Medicine consulted for diabetic co-management.     #DM type 2 poorly controlled - A1c of 15.9   - Continue Glargine 20 units QHS  - Will start pre-meal 6 units in addition to Medium dose sliding scale   - Will monitor fingersticks, goals (FS <180)  and adjust as needed   - Diabetic diet   - Diabetic nutrition consult      #HTN - well controlled  - continue lisinopril 5     #HLD   - Continue atorvastatin    #CKD stage 3  - F/U Full set of Labs - BMP, CBC   #Depression   - Per psychiatry

## 2019-10-17 NOTE — ED ADULT NURSE NOTE - CHIEF COMPLAINT QUOTE
Pt. from [facility](inpatient) with c/o hyperglycemia 424.  Pt. was not given evening medication (40u Lantus). Pt. with hx of depression. Aide at bedside.

## 2019-10-17 NOTE — ED ADULT TRIAGE NOTE - CHIEF COMPLAINT QUOTE
Pt. from [facility](inpatient) with c/o hyperglycemia 424.  Pt. was not given evening medication (40u Lantus). Pt. with hx of depression. Aide at bedside.

## 2019-10-17 NOTE — CHART NOTE - NSCHARTNOTEFT_GEN_A_CORE
Emergency Contact not listed on [place], however found contact for sister NAFISA KAUFMAN phone number: [phone] from [facility] paperwork. Notified sister of patients transfer to [facility] ED and why. All questions and concerns answered.   Discussed that patients sister, Nafisa Kaufman, could get an update on patients status (admission or return to [facility]) by calling back at [phone] and asking the nurses.   Will continue to monitor/track patient overnight.     Nikkie Jaquez PA  [id number] Emergency Contact not listed on [place], however found contact for sister NAFISA KAUFMAN phone number: [phone] from [facility] paperwork. Notified sister of patients transfer to [facility] ED and why. Pt's sister, Nafisa, reports that patient has history of noncompliance to anti-diabetic medication with frequent ED visits for elevated FS. All questions and concerns answered.   Discussed that patients sister, Nafisa Kaufman, could get an update on patients status (admission or return to [facility]) by calling back at [phone] and asking the nurses.   Will continue to monitor/track patient overnight.     Nikkie Jaquez PA  [id number]

## 2019-10-18 VITALS
RESPIRATION RATE: 18 BRPM | SYSTOLIC BLOOD PRESSURE: 132 MMHG | TEMPERATURE: 98 F | OXYGEN SATURATION: 100 % | HEART RATE: 80 BPM | DIASTOLIC BLOOD PRESSURE: 80 MMHG

## 2019-10-18 LAB
ALBUMIN SERPL ELPH-MCNC: 3.4 G/DL — SIGNIFICANT CHANGE UP (ref 3.3–5)
ALP SERPL-CCNC: 97 U/L — SIGNIFICANT CHANGE UP (ref 40–120)
ALT FLD-CCNC: 12 U/L — SIGNIFICANT CHANGE UP (ref 4–33)
ANION GAP SERPL CALC-SCNC: 11 MMO/L — SIGNIFICANT CHANGE UP (ref 7–14)
ANION GAP SERPL CALC-SCNC: 14 MMO/L — SIGNIFICANT CHANGE UP (ref 7–14)
APPEARANCE UR: CLEAR — SIGNIFICANT CHANGE UP
APPEARANCE UR: SIGNIFICANT CHANGE UP
AST SERPL-CCNC: 17 U/L — SIGNIFICANT CHANGE UP (ref 4–32)
B-OH-BUTYR SERPL-SCNC: < 0 MMOL/L — LOW (ref 0–0.4)
BACTERIA # UR AUTO: NEGATIVE — SIGNIFICANT CHANGE UP
BACTERIA # UR AUTO: SIGNIFICANT CHANGE UP
BASE EXCESS BLDV CALC-SCNC: -3.1 MMOL/L — SIGNIFICANT CHANGE UP
BASOPHILS # BLD AUTO: 0.08 K/UL — SIGNIFICANT CHANGE UP (ref 0–0.2)
BASOPHILS NFR BLD AUTO: 0.8 % — SIGNIFICANT CHANGE UP (ref 0–2)
BILIRUB SERPL-MCNC: < 0.2 MG/DL — LOW (ref 0.2–1.2)
BILIRUB UR-MCNC: NEGATIVE — SIGNIFICANT CHANGE UP
BILIRUB UR-MCNC: NEGATIVE — SIGNIFICANT CHANGE UP
BLOOD GAS VENOUS - CREATININE: 2 MG/DL — HIGH (ref 0.5–1.3)
BLOOD UR QL VISUAL: NEGATIVE — SIGNIFICANT CHANGE UP
BLOOD UR QL VISUAL: SIGNIFICANT CHANGE UP
BUN SERPL-MCNC: 29 MG/DL — HIGH (ref 7–23)
BUN SERPL-MCNC: 34 MG/DL — HIGH (ref 7–23)
CALCIUM SERPL-MCNC: 8.8 MG/DL — SIGNIFICANT CHANGE UP (ref 8.4–10.5)
CALCIUM SERPL-MCNC: 9.3 MG/DL — SIGNIFICANT CHANGE UP (ref 8.4–10.5)
CHLORIDE BLDV-SCNC: 104 MMOL/L — SIGNIFICANT CHANGE UP (ref 96–108)
CHLORIDE SERPL-SCNC: 106 MMOL/L — SIGNIFICANT CHANGE UP (ref 98–107)
CHLORIDE SERPL-SCNC: 98 MMOL/L — SIGNIFICANT CHANGE UP (ref 98–107)
CHLORIDE UR-SCNC: 39 MMOL/L — SIGNIFICANT CHANGE UP
CHOLEST SERPL-MCNC: 152 MG/DL — SIGNIFICANT CHANGE UP (ref 120–199)
CO2 SERPL-SCNC: 19 MMOL/L — LOW (ref 22–31)
CO2 SERPL-SCNC: 20 MMOL/L — LOW (ref 22–31)
COLOR SPEC: SIGNIFICANT CHANGE UP
COLOR SPEC: YELLOW — SIGNIFICANT CHANGE UP
CREAT ?TM UR-MCNC: 141.1 MG/DL — SIGNIFICANT CHANGE UP
CREAT SERPL-MCNC: 1.81 MG/DL — HIGH (ref 0.5–1.3)
CREAT SERPL-MCNC: 1.94 MG/DL — HIGH (ref 0.5–1.3)
EOSINOPHIL # BLD AUTO: 0.21 K/UL — SIGNIFICANT CHANGE UP (ref 0–0.5)
EOSINOPHIL NFR BLD AUTO: 2.1 % — SIGNIFICANT CHANGE UP (ref 0–6)
EPI CELLS # UR: SIGNIFICANT CHANGE UP
GAS PNL BLDV: 129 MMOL/L — LOW (ref 136–146)
GLUCOSE BLDV-MCNC: 361 MG/DL — HIGH (ref 70–99)
GLUCOSE SERPL-MCNC: 372 MG/DL — HIGH (ref 70–99)
GLUCOSE SERPL-MCNC: 70 MG/DL — SIGNIFICANT CHANGE UP (ref 70–99)
GLUCOSE UR-MCNC: 500 — HIGH
GLUCOSE UR-MCNC: >1000 — HIGH
HCO3 BLDV-SCNC: 21 MMOL/L — SIGNIFICANT CHANGE UP (ref 20–27)
HCT VFR BLD CALC: 33.7 % — LOW (ref 34.5–45)
HCT VFR BLDV CALC: 33.3 % — LOW (ref 34.5–45)
HDLC SERPL-MCNC: 60 MG/DL — SIGNIFICANT CHANGE UP (ref 45–65)
HGB BLD-MCNC: 10.5 G/DL — LOW (ref 11.5–15.5)
HGB BLDV-MCNC: 10.8 G/DL — LOW (ref 11.5–15.5)
HYALINE CASTS # UR AUTO: NEGATIVE — SIGNIFICANT CHANGE UP
IMM GRANULOCYTES NFR BLD AUTO: 0.6 % — SIGNIFICANT CHANGE UP (ref 0–1.5)
KETONES UR-MCNC: NEGATIVE — SIGNIFICANT CHANGE UP
KETONES UR-MCNC: SIGNIFICANT CHANGE UP
LACTATE BLDV-MCNC: 1.7 MMOL/L — SIGNIFICANT CHANGE UP (ref 0.5–2)
LEUKOCYTE ESTERASE UR-ACNC: NEGATIVE — SIGNIFICANT CHANGE UP
LEUKOCYTE ESTERASE UR-ACNC: NEGATIVE — SIGNIFICANT CHANGE UP
LIPID PNL WITH DIRECT LDL SERPL: 73 MG/DL — SIGNIFICANT CHANGE UP
LYMPHOCYTES # BLD AUTO: 3.31 K/UL — HIGH (ref 1–3.3)
LYMPHOCYTES # BLD AUTO: 33.4 % — SIGNIFICANT CHANGE UP (ref 13–44)
MCHC RBC-ENTMCNC: 29.2 PG — SIGNIFICANT CHANGE UP (ref 27–34)
MCHC RBC-ENTMCNC: 31.2 % — LOW (ref 32–36)
MCV RBC AUTO: 93.6 FL — SIGNIFICANT CHANGE UP (ref 80–100)
MONOCYTES # BLD AUTO: 0.6 K/UL — SIGNIFICANT CHANGE UP (ref 0–0.9)
MONOCYTES NFR BLD AUTO: 6 % — SIGNIFICANT CHANGE UP (ref 2–14)
NEUTROPHILS # BLD AUTO: 5.66 K/UL — SIGNIFICANT CHANGE UP (ref 1.8–7.4)
NEUTROPHILS NFR BLD AUTO: 57.1 % — SIGNIFICANT CHANGE UP (ref 43–77)
NITRITE UR-MCNC: NEGATIVE — SIGNIFICANT CHANGE UP
NITRITE UR-MCNC: NEGATIVE — SIGNIFICANT CHANGE UP
NRBC # FLD: 0 K/UL — SIGNIFICANT CHANGE UP (ref 0–0)
PCO2 BLDV: 46 MMHG — SIGNIFICANT CHANGE UP (ref 41–51)
PH BLDV: 7.31 PH — LOW (ref 7.32–7.43)
PH UR: 6 — SIGNIFICANT CHANGE UP (ref 5–8)
PH UR: 6.5 — SIGNIFICANT CHANGE UP (ref 5–8)
PLATELET # BLD AUTO: 361 K/UL — SIGNIFICANT CHANGE UP (ref 150–400)
PMV BLD: 9.2 FL — SIGNIFICANT CHANGE UP (ref 7–13)
PO2 BLDV: 38 MMHG — SIGNIFICANT CHANGE UP (ref 35–40)
POTASSIUM BLDV-SCNC: 4.9 MMOL/L — HIGH (ref 3.4–4.5)
POTASSIUM SERPL-MCNC: 4 MMOL/L — SIGNIFICANT CHANGE UP (ref 3.5–5.3)
POTASSIUM SERPL-MCNC: 5 MMOL/L — SIGNIFICANT CHANGE UP (ref 3.5–5.3)
POTASSIUM SERPL-SCNC: 4 MMOL/L — SIGNIFICANT CHANGE UP (ref 3.5–5.3)
POTASSIUM SERPL-SCNC: 5 MMOL/L — SIGNIFICANT CHANGE UP (ref 3.5–5.3)
POTASSIUM UR-SCNC: 96.2 MMOL/L — SIGNIFICANT CHANGE UP
PROT SERPL-MCNC: 7.5 G/DL — SIGNIFICANT CHANGE UP (ref 6–8.3)
PROT UR-MCNC: 200 — HIGH
PROT UR-MCNC: >300 — SIGNIFICANT CHANGE UP
RBC # BLD: 3.6 M/UL — LOW (ref 3.8–5.2)
RBC # FLD: 14.8 % — HIGH (ref 10.3–14.5)
RBC CASTS # UR COMP ASSIST: SIGNIFICANT CHANGE UP (ref 0–?)
RBC CASTS # UR COMP ASSIST: SIGNIFICANT CHANGE UP (ref 0–?)
SAO2 % BLDV: 67 % — SIGNIFICANT CHANGE UP (ref 60–85)
SODIUM SERPL-SCNC: 128 MMOL/L — LOW (ref 135–145)
SODIUM SERPL-SCNC: 140 MMOL/L — SIGNIFICANT CHANGE UP (ref 135–145)
SODIUM UR-SCNC: 23 MMOL/L — SIGNIFICANT CHANGE UP
SP GR SPEC: 1.02 — SIGNIFICANT CHANGE UP (ref 1–1.04)
SP GR SPEC: > 1.04 — HIGH (ref 1–1.04)
SQUAMOUS # UR AUTO: SIGNIFICANT CHANGE UP
TRIGL SERPL-MCNC: 164 MG/DL — HIGH (ref 10–149)
UROBILINOGEN FLD QL: 0.2 — SIGNIFICANT CHANGE UP
UROBILINOGEN FLD QL: NORMAL — SIGNIFICANT CHANGE UP
WBC # BLD: 9.92 K/UL — SIGNIFICANT CHANGE UP (ref 3.8–10.5)
WBC # FLD AUTO: 9.92 K/UL — SIGNIFICANT CHANGE UP (ref 3.8–10.5)
WBC UR QL: SIGNIFICANT CHANGE UP (ref 0–?)
WBC UR QL: SIGNIFICANT CHANGE UP (ref 0–?)

## 2019-10-18 PROCEDURE — 99233 SBSQ HOSP IP/OBS HIGH 50: CPT

## 2019-10-18 PROCEDURE — 99232 SBSQ HOSP IP/OBS MODERATE 35: CPT

## 2019-10-18 RX ORDER — INSULIN GLARGINE 100 [IU]/ML
10 INJECTION, SOLUTION SUBCUTANEOUS ONCE
Refills: 0 | Status: DISCONTINUED | OUTPATIENT
Start: 2019-10-18 | End: 2019-10-18

## 2019-10-18 RX ORDER — INSULIN LISPRO 100/ML
10 VIAL (ML) SUBCUTANEOUS
Refills: 0 | Status: DISCONTINUED | OUTPATIENT
Start: 2019-10-18 | End: 2019-10-23

## 2019-10-18 RX ORDER — AMLODIPINE BESYLATE 2.5 MG/1
5 TABLET ORAL DAILY
Refills: 0 | Status: DISCONTINUED | OUTPATIENT
Start: 2019-10-18 | End: 2019-10-24

## 2019-10-18 RX ORDER — INSULIN GLARGINE 100 [IU]/ML
20 INJECTION, SOLUTION SUBCUTANEOUS AT BEDTIME
Refills: 0 | Status: DISCONTINUED | OUTPATIENT
Start: 2019-10-18 | End: 2019-10-22

## 2019-10-18 RX ORDER — INSULIN LISPRO 100/ML
12 VIAL (ML) SUBCUTANEOUS
Refills: 0 | Status: DISCONTINUED | OUTPATIENT
Start: 2019-10-18 | End: 2019-10-18

## 2019-10-18 RX ORDER — SODIUM CHLORIDE 9 MG/ML
1000 INJECTION INTRAMUSCULAR; INTRAVENOUS; SUBCUTANEOUS ONCE
Refills: 0 | Status: DISCONTINUED | OUTPATIENT
Start: 2019-10-18 | End: 2019-10-18

## 2019-10-18 RX ORDER — INSULIN GLARGINE 100 [IU]/ML
25 INJECTION, SOLUTION SUBCUTANEOUS AT BEDTIME
Refills: 0 | Status: DISCONTINUED | OUTPATIENT
Start: 2019-10-18 | End: 2019-10-22

## 2019-10-18 RX ADMIN — INSULIN GLARGINE 25 UNIT(S): 100 INJECTION, SOLUTION SUBCUTANEOUS at 23:16

## 2019-10-18 RX ADMIN — Medication 6 UNIT(S): at 08:20

## 2019-10-18 RX ADMIN — Medication 10 UNIT(S): at 17:05

## 2019-10-18 RX ADMIN — Medication 6 UNIT(S): at 00:48

## 2019-10-18 RX ADMIN — Medication 12 UNIT(S): at 12:30

## 2019-10-18 RX ADMIN — Medication 37.5 MILLIGRAM(S): at 09:20

## 2019-10-18 RX ADMIN — SODIUM CHLORIDE 1000 MILLILITER(S): 9 INJECTION INTRAMUSCULAR; INTRAVENOUS; SUBCUTANEOUS at 00:48

## 2019-10-18 RX ADMIN — ATORVASTATIN CALCIUM 20 MILLIGRAM(S): 80 TABLET, FILM COATED ORAL at 23:16

## 2019-10-18 RX ADMIN — MONTELUKAST 10 MILLIGRAM(S): 4 TABLET, CHEWABLE ORAL at 09:20

## 2019-10-18 RX ADMIN — AMLODIPINE BESYLATE 5 MILLIGRAM(S): 2.5 TABLET ORAL at 12:49

## 2019-10-18 RX ADMIN — INSULIN GLARGINE 20 UNIT(S): 100 INJECTION, SOLUTION SUBCUTANEOUS at 02:53

## 2019-10-18 NOTE — PROGRESS NOTE ADULT - ASSESSMENT
65 yo woman with history of depression, Type 2 Diabetes, HTN, HLD, CKD stage 3, initially presented to [place] with elevated fingerstick. She was found to have FS in the 400s, no ketones,  admitted with [facility], found to have depression transferred to [facility] for further management. Medicine consulted for diabetic co-management.     #DM type 2 poorly controlled - A1c of 15.9   - Continue Glargine 20 units QHS  - Will start pre-meal 6 units in addition to Medium dose sliding scale   - Will monitor fingersticks, goals (FS <180)  and adjust as needed   - Diabetic diet   - Diabetic nutrition consult   - Endocrine consult given elevated blood sugar - will email      #HTN - well controlled  - - holding lisinopril     #HLD   - Continue atorvastatin    #CKD stage 3  - F/U Full set of Labs - BMP, CBC   - Holding lisinopril   #Depression   - Per psychiatry 63 yo woman with history of depression, poorly controlled Type 2 Diabetes - 15.9 - 10/17 , HTN, HLD, CKD stage 3, initially presented to [place] with elevated fingerstick. She was found to have FS in the 400s, no ketones,  admitted with [facility], found to have depression transferred to [facility] for further management. Medicine consulted for diabetic co-management.     #DM type 2 poorly controlled - A1c of 15.9   - Continue Glargine 20 units QHS  - Will start pre-meal 6 units in addition to Medium dose sliding scale   - Will monitor fingersticks, goals (FS <180)  and adjust as needed   - Diabetic diet   - Diabetic nutrition consult   - Endocrine consult given elevated blood sugar - emailed consult awaiting reuslts.       #HTN - well controlled  - - holding lisinopril     #HLD   - Continue atorvastatin    #CKD stage 3  - F/U Full set of Labs - BMP, CBC   - Holding lisinopril   #Depression   - Per psychiatry 65 yo woman with history of depression, poorly controlled Type 2 Diabetes - 15.9 - 10/17 , HTN, HLD, CKD stage 3, initially presented to [place] with elevated fingerstick. She was found to have FS in the 400s, no ketones,  admitted with [facility], found to have depression transferred to [facility] for further management. Medicine consulted for diabetic co-management.     #DM type 2 poorly controlled - A1c of 15.9   - increase  Glargine 25 units QHS  - Will start pre-meal 12 units in addition to Medium dose sliding scale   - Will monitor fingersticks, goals (FS <180)  and adjust as needed   - Diabetic diet   - Diabetic nutrition consult   - Endocrine consult given elevated blood sugar - emailed consult awaiting reuslts.       #HTN - well controlled  - - holding lisinopril     #HLD   - Continue atorvastatin    #CKD stage 3  - F/U Full set of Labs - BMP, CBC   - Holding lisinopril   #Depression   - Per psychiatry 63 yo woman with history of depression, poorly controlled Type 2 Diabetes - 15.9 - 10/17 , HTN, HLD, CKD stage 3, initially presented to [place] with elevated fingerstick. She was found to have FS in the 400s, no ketones,  admitted with [facility], found to have depression transferred to [facility] for further management. Medicine consulted for diabetic co-management.     #DM type 2 poorly controlled - A1c of 15.9   - increase  Glargine 25 units QHS  - Will start pre-meal 12 units in addition to correction dose sliding scale   - Will monitor fingersticks, goals (FS <180)  and adjust as needed   - Per collateral obtained form pharmacy, it appears that Dr. Andrews adjusted medication - lantus 50 units QHS and 10 premeal. However, due to insurance issues never filled.   - Diabetic diet   - Diabetic nutrition consult, appreciate recs   - Endocrine consult given elevated blood sugar - emailed consult awaiting follow up .       #HTN - above goal   -  holding lisinopril   - Will start amlodipine 5mg; will uptitrate as tolerated     #HLD   - Continue atorvastatin    #CKD stage 3 - unclear baseline (appears to be around 1.9)   -Monitor BMP   - In the ED, patient received 1L IVF  - Holding lisinopril   #Depression   - Per psychiatry 63 yo woman with history of depression, poorly controlled Type 2 Diabetes - 15.9 - 10/17 , HTN, HLD, CKD stage 3, initially presented to [place] with elevated fingerstick. She was found to have FS in the 400s, no ketones,  admitted with [facility], found to have depression transferred to [facility] for further management. Medicine consulted for diabetic co-management.     #DM type 2 poorly controlled - A1c of 15.9   - increase  Glargine 25 units QHS  - Will start pre-meal 10 units in addition to correction dose sliding scale   - Will monitor fingersticks, goals (FS <180)  and adjust as needed   - Per collateral obtained form pharmacy, it appears that Dr. Andrews adjusted medication - lantus 50 units QHS and 10 premeal. However, due to insurance issues never filled.   - Diabetic diet   - Diabetic nutrition consult, appreciate recs   - Endocrine consult given elevated blood sugar - emailed consult awaiting follow up .       #HTN - above goal   -  holding lisinopril   - Will start amlodipine 5mg; will uptitrate as tolerated     #HLD   - Continue atorvastatin    #CKD stage 3 - unclear baseline (appears to be around 1.9)   -Monitor BMP   - In the ED, patient received 1L IVF  - Holding lisinopril   #Depression   - Per psychiatry

## 2019-10-18 NOTE — PROGRESS NOTE ADULT - SUBJECTIVE AND OBJECTIVE BOX
CC/Reason for Consult:     SUBJECTIVE / OVERNIGHT EVENTS: Yesterday, patient noted to have     MEDICATIONS  (STANDING):  atorvastatin 20 milliGRAM(s) Oral at bedtime  dextrose 50% Injectable 25 Gram(s) IV Push once  insulin glargine Injectable (LANTUS) 20 Unit(s) SubCutaneous at bedtime  insulin lispro (HumaLOG) corrective regimen sliding scale   SubCutaneous three times a day before meals  insulin lispro (HumaLOG) corrective regimen sliding scale   SubCutaneous at bedtime  insulin lispro Injectable (HumaLOG) 12 Unit(s) SubCutaneous three times a day before meals  montelukast 10 milliGRAM(s) Oral daily  venlafaxine XR 37.5 milliGRAM(s) Oral daily    MEDICATIONS  (PRN):  glucagon  Injectable 1 milliGRAM(s) IntraMuscular once PRN Glucose LESS THAN 70 milligrams/deciliter  LORazepam     Tablet 1 milliGRAM(s) Oral every 6 hours PRN Agitation  LORazepam   Injectable 1 milliGRAM(s) IntraMuscular every 6 hours PRN Agitation      Vital Signs Last 24 Hrs  T(C): 36.3 (18 Oct 2019 08:35), Max: 36.9 (17 Oct 2019 20:17)  T(F): 97.4 (18 Oct 2019 08:35), Max: 98.5 (18 Oct 2019 01:29)  HR: 80 (18 Oct 2019 01:29) (80 - 94)  BP: 132/80 (18 Oct 2019 01:29) (124/84 - 153/73)  BP(mean): --  RR: 20 (18 Oct 2019 08:35) (16 - 20)  SpO2: 100% (18 Oct 2019 01:29) (100% - 100%)  CAPILLARY BLOOD GLUCOSE      POCT Blood Glucose.: 77 mg/dL (18 Oct 2019 08:14)  POCT Blood Glucose.: 110 mg/dL (18 Oct 2019 04:27)  POCT Blood Glucose.: 174 mg/dL (18 Oct 2019 02:51)  POCT Blood Glucose.: 320 mg/dL (18 Oct 2019 00:46)  POCT Blood Glucose.: 427 mg/dL (17 Oct 2019 21:45)  POCT Blood Glucose.: 491 mg/dL (17 Oct 2019 20:12)  POCT Blood Glucose.: 513 mg/dL (17 Oct 2019 20:09)  POCT Blood Glucose.: 399 mg/dL (17 Oct 2019 16:13)  POCT Blood Glucose.: 248 mg/dL (17 Oct 2019 12:18)        PHYSICAL EXAM:  GENERAL: NAD, well-developed  HEAD:  Atraumatic, Normocephalic  EYES: EOMI, conjunctiva and sclera clear  NECK: Supple, No JVD  CHEST/LUNG: Clear to auscultation bilaterally; No wheeze  HEART: Regular rate and rhythm; No murmurs, rubs, or gallops  ABDOMEN: Soft, Nontender, Nondistended; Bowel sounds present  EXTREMITIES:  2+ Peripheral Pulses, No clubbing, cyanosis, or edema  PSYCH: AAOx3  NEUROLOGY: non-focal  SKIN: No rashes or lesions    LABS:                        10.5   9.92  )-----------( 361      ( 18 Oct 2019 00:00 )             33.7     10-18    128<L>  |  98  |  34<H>  ----------------------------<  372<H>  5.0   |  19<L>  |  1.94<H>    Ca    8.8      18 Oct 2019 00:00    TPro  7.5  /  Alb  3.4  /  TBili  < 0.2<L>  /  DBili  x   /  AST  17  /  ALT  12  /  AlkPhos  97  10-18          Urinalysis Basic - ( 18 Oct 2019 00:00 )    Color: LIGHT YELLOW / Appearance: CLEAR / S.021 / pH: 6.5  Gluc: >1000 / Ketone: NEGATIVE  / Bili: NEGATIVE / Urobili: NORMAL   Blood: NEGATIVE / Protein: 200 / Nitrite: NEGATIVE   Leuk Esterase: NEGATIVE / RBC: 0-2 / WBC 3-5   Sq Epi: FEW / Non Sq Epi: x / Bacteria: NEGATIVE        RADIOLOGY & ADDITIONAL TESTS:    Imaging Personally Reviewed:    Consultant(s) Notes Reviewed:      Care Discussed with Consultants/Other Providers: CC/Reason for Consult:     SUBJECTIVE / OVERNIGHT EVENTS: Yesterday, patient noted to have elevated finger sticks in 450s. Yesterday, night pt. did not receive her long acting insulin. She received a total of 22 units short acting.     This am,     MEDICATIONS  (STANDING):  atorvastatin 20 milliGRAM(s) Oral at bedtime  dextrose 50% Injectable 25 Gram(s) IV Push once  insulin glargine Injectable (LANTUS) 20 Unit(s) SubCutaneous at bedtime  insulin lispro (HumaLOG) corrective regimen sliding scale   SubCutaneous three times a day before meals  insulin lispro (HumaLOG) corrective regimen sliding scale   SubCutaneous at bedtime  insulin lispro Injectable (HumaLOG) 12 Unit(s) SubCutaneous three times a day before meals  montelukast 10 milliGRAM(s) Oral daily  venlafaxine XR 37.5 milliGRAM(s) Oral daily    MEDICATIONS  (PRN):  glucagon  Injectable 1 milliGRAM(s) IntraMuscular once PRN Glucose LESS THAN 70 milligrams/deciliter  LORazepam     Tablet 1 milliGRAM(s) Oral every 6 hours PRN Agitation  LORazepam   Injectable 1 milliGRAM(s) IntraMuscular every 6 hours PRN Agitation      Vital Signs Last 24 Hrs  T(C): 36.3 (18 Oct 2019 08:35), Max: 36.9 (17 Oct 2019 20:17)  T(F): 97.4 (18 Oct 2019 08:35), Max: 98.5 (18 Oct 2019 01:29)  HR: 80 (18 Oct 2019 01:29) (80 - 94)  BP: 132/80 (18 Oct 2019 01:29) (124/84 - 153/73)  BP(mean): --  RR: 20 (18 Oct 2019 08:35) (16 - 20)  SpO2: 100% (18 Oct 2019 01:29) (100% - 100%)  CAPILLARY BLOOD GLUCOSE      POCT Blood Glucose.: 77 mg/dL (18 Oct 2019 08:14)  POCT Blood Glucose.: 110 mg/dL (18 Oct 2019 04:27)  POCT Blood Glucose.: 174 mg/dL (18 Oct 2019 02:51)  POCT Blood Glucose.: 320 mg/dL (18 Oct 2019 00:46)  POCT Blood Glucose.: 427 mg/dL (17 Oct 2019 21:45)  POCT Blood Glucose.: 491 mg/dL (17 Oct 2019 20:12)  POCT Blood Glucose.: 513 mg/dL (17 Oct 2019 20:09)  POCT Blood Glucose.: 399 mg/dL (17 Oct 2019 16:13)  POCT Blood Glucose.: 248 mg/dL (17 Oct 2019 12:18)        PHYSICAL EXAM:  GENERAL: NAD, well-developed  HEAD:  Atraumatic, Normocephalic  EYES: EOMI, conjunctiva and sclera clear  NECK: Supple, No JVD  CHEST/LUNG: Clear to auscultation bilaterally; No wheeze  HEART: Regular rate and rhythm; No murmurs, rubs, or gallops  ABDOMEN: Soft, Nontender, Nondistended; Bowel sounds present  EXTREMITIES:  2+ Peripheral Pulses, No clubbing, cyanosis, or edema  PSYCH: AAOx3  NEUROLOGY: non-focal  SKIN: No rashes or lesions    LABS:                        10.5   9.92  )-----------( 361      ( 18 Oct 2019 00:00 )             33.7     10-18    128<L>  |  98  |  34<H>  ----------------------------<  372<H>  5.0   |  19<L>  |  1.94<H>    Ca    8.8      18 Oct 2019 00:00    TPro  7.5  /  Alb  3.4  /  TBili  < 0.2<L>  /  DBili  x   /  AST  17  /  ALT  12  /  AlkPhos  97  10-18          Urinalysis Basic - ( 18 Oct 2019 00:00 )    Color: LIGHT YELLOW / Appearance: CLEAR / S.021 / pH: 6.5  Gluc: >1000 / Ketone: NEGATIVE  / Bili: NEGATIVE / Urobili: NORMAL   Blood: NEGATIVE / Protein: 200 / Nitrite: NEGATIVE   Leuk Esterase: NEGATIVE / RBC: 0-2 / WBC 3-5   Sq Epi: FEW / Non Sq Epi: x / Bacteria: NEGATIVE        RADIOLOGY & ADDITIONAL TESTS:    Imaging Personally Reviewed:    Consultant(s) Notes Reviewed:      Care Discussed with Consultants/Other Providers: CC/Reason for Consult:     SUBJECTIVE / OVERNIGHT EVENTS: Yesterday, patient noted to have elevated finger sticks in 450s anc concern for . Yesterday, patient re She received a total of 32 units short acting and 20 units of lantus - total of 52 units of insulin.     This am,     MEDICATIONS  (STANDING):  atorvastatin 20 milliGRAM(s) Oral at bedtime  dextrose 50% Injectable 25 Gram(s) IV Push once  insulin glargine Injectable (LANTUS) 20 Unit(s) SubCutaneous at bedtime  insulin lispro (HumaLOG) corrective regimen sliding scale   SubCutaneous three times a day before meals  insulin lispro (HumaLOG) corrective regimen sliding scale   SubCutaneous at bedtime  insulin lispro Injectable (HumaLOG) 12 Unit(s) SubCutaneous three times a day before meals  montelukast 10 milliGRAM(s) Oral daily  venlafaxine XR 37.5 milliGRAM(s) Oral daily    MEDICATIONS  (PRN):  glucagon  Injectable 1 milliGRAM(s) IntraMuscular once PRN Glucose LESS THAN 70 milligrams/deciliter  LORazepam     Tablet 1 milliGRAM(s) Oral every 6 hours PRN Agitation  LORazepam   Injectable 1 milliGRAM(s) IntraMuscular every 6 hours PRN Agitation      Vital Signs Last 24 Hrs  T(C): 36.3 (18 Oct 2019 08:35), Max: 36.9 (17 Oct 2019 20:17)  T(F): 97.4 (18 Oct 2019 08:35), Max: 98.5 (18 Oct 2019 01:29)  HR: 80 (18 Oct 2019 01:29) (80 - 94)  BP: 132/80 (18 Oct 2019 01:29) (124/84 - 153/73)  BP(mean): --  RR: 20 (18 Oct 2019 08:35) (16 - 20)  SpO2: 100% (18 Oct 2019 01:29) (100% - 100%)  CAPILLARY BLOOD GLUCOSE      POCT Blood Glucose.: 77 mg/dL (18 Oct 2019 08:14)  POCT Blood Glucose.: 110 mg/dL (18 Oct 2019 04:27)  POCT Blood Glucose.: 174 mg/dL (18 Oct 2019 02:51)  POCT Blood Glucose.: 320 mg/dL (18 Oct 2019 00:46)  POCT Blood Glucose.: 427 mg/dL (17 Oct 2019 21:45)  POCT Blood Glucose.: 491 mg/dL (17 Oct 2019 20:12)  POCT Blood Glucose.: 513 mg/dL (17 Oct 2019 20:09)  POCT Blood Glucose.: 399 mg/dL (17 Oct 2019 16:13)  POCT Blood Glucose.: 248 mg/dL (17 Oct 2019 12:18)        PHYSICAL EXAM:  GENERAL: NAD, well-developed  HEAD:  Atraumatic, Normocephalic  EYES: EOMI, conjunctiva and sclera clear  NECK: Supple, No JVD  CHEST/LUNG: Clear to auscultation bilaterally; No wheeze  HEART: Regular rate and rhythm; No murmurs, rubs, or gallops  ABDOMEN: Soft, Nontender, Nondistended; Bowel sounds present  EXTREMITIES:  2+ Peripheral Pulses, No clubbing, cyanosis, or edema  PSYCH: AAOx3  NEUROLOGY: non-focal  SKIN: No rashes or lesions    LABS:                        10.5   9.92  )-----------( 361      ( 18 Oct 2019 00:00 )             33.7     10-18    128<L>  |  98  |  34<H>  ----------------------------<  372<H>  5.0   |  19<L>  |  1.94<H>    Ca    8.8      18 Oct 2019 00:00    TPro  7.5  /  Alb  3.4  /  TBili  < 0.2<L>  /  DBili  x   /  AST  17  /  ALT  12  /  AlkPhos  97  10-18          Urinalysis Basic - ( 18 Oct 2019 00:00 )    Color: LIGHT YELLOW / Appearance: CLEAR / S.021 / pH: 6.5  Gluc: >1000 / Ketone: NEGATIVE  / Bili: NEGATIVE / Urobili: NORMAL   Blood: NEGATIVE / Protein: 200 / Nitrite: NEGATIVE   Leuk Esterase: NEGATIVE / RBC: 0-2 / WBC 3-5   Sq Epi: FEW / Non Sq Epi: x / Bacteria: NEGATIVE        RADIOLOGY & ADDITIONAL TESTS:    Imaging Personally Reviewed:    Consultant(s) Notes Reviewed:      Care Discussed with Consultants/Other Providers: CC/Reason for Consult: Diabetes regimen     SUBJECTIVE / OVERNIGHT EVENTS: Yesterday, patient noted to have elevated finger sticks in 450s anc concern for . Yesterday, patient re She received a total of 32 units short acting and 20 units of lantus - total of 52 units of insulin. Reports that she uses [facility] at [place].     This am, patient without medical complaints. States that her sugars are always high. Reports that she was aggravated yesterday, since still being hospitalized. She is interested in going home. Denies any dysuria, increased urinary frequency or hematuria.     MEDICATIONS  (STANDING):  atorvastatin 20 milliGRAM(s) Oral at bedtime  dextrose 50% Injectable 25 Gram(s) IV Push once  insulin glargine Injectable (LANTUS) 20 Unit(s) SubCutaneous at bedtime  insulin lispro (HumaLOG) corrective regimen sliding scale   SubCutaneous three times a day before meals  insulin lispro (HumaLOG) corrective regimen sliding scale   SubCutaneous at bedtime  insulin lispro Injectable (HumaLOG) 12 Unit(s) SubCutaneous three times a day before meals  montelukast 10 milliGRAM(s) Oral daily  venlafaxine XR 37.5 milliGRAM(s) Oral daily    MEDICATIONS  (PRN):  glucagon  Injectable 1 milliGRAM(s) IntraMuscular once PRN Glucose LESS THAN 70 milligrams/deciliter  LORazepam     Tablet 1 milliGRAM(s) Oral every 6 hours PRN Agitation  LORazepam   Injectable 1 milliGRAM(s) IntraMuscular every 6 hours PRN Agitation      Vital Signs Last 24 Hrs  T(C): 36.3 (18 Oct 2019 08:35), Max: 36.9 (17 Oct 2019 20:17)  T(F): 97.4 (18 Oct 2019 08:35), Max: 98.5 (18 Oct 2019 01:29)  HR: 80 (18 Oct 2019 01:29) (80 - 94)  BP: 132/80 (18 Oct 2019 01:29) (124/84 - 153/73)  BP(mean): --  RR: 20 (18 Oct 2019 08:35) (16 - 20)  SpO2: 100% (18 Oct 2019 01:29) (100% - 100%)  CAPILLARY BLOOD GLUCOSE      POCT Blood Glucose.: 77 mg/dL (18 Oct 2019 08:14)  POCT Blood Glucose.: 110 mg/dL (18 Oct 2019 04:27)  POCT Blood Glucose.: 174 mg/dL (18 Oct 2019 02:51)  POCT Blood Glucose.: 320 mg/dL (18 Oct 2019 00:46)  POCT Blood Glucose.: 427 mg/dL (17 Oct 2019 21:45)  POCT Blood Glucose.: 491 mg/dL (17 Oct 2019 20:12)  POCT Blood Glucose.: 513 mg/dL (17 Oct 2019 20:09)  POCT Blood Glucose.: 399 mg/dL (17 Oct 2019 16:13)  POCT Blood Glucose.: 248 mg/dL (17 Oct 2019 12:18)        PHYSICAL EXAM:  GENERAL: NAD, well-developed  HEAD:  Atraumatic, Normocephalic  EYES: EOMI, conjunctiva and sclera clear  NECK: Supple, No JVD  CHEST/LUNG: Clear to auscultation bilaterally; No wheeze  HEART: Regular rate and rhythm; No murmurs, rubs, or gallops  ABDOMEN: Soft, Nontender, Nondistended; Bowel sounds present  EXTREMITIES:  2+ Peripheral Pulses, No clubbing, cyanosis, or edema  PSYCH: AAOx3  NEUROLOGY: non-focal  SKIN: No rashes or lesions    LABS:                        10.5   9.92  )-----------( 361      ( 18 Oct 2019 00:00 )             33.7     10-18    128<L>  |  98  |  34<H>  ----------------------------<  372<H>  5.0   |  19<L>  |  1.94<H>    Ca    8.8      18 Oct 2019 00:00    TPro  7.5  /  Alb  3.4  /  TBili  < 0.2<L>  /  DBili  x   /  AST  17  /  ALT  12  /  AlkPhos  97  10-18          Urinalysis Basic - ( 18 Oct 2019 00:00 )    Color: LIGHT YELLOW / Appearance: CLEAR / S.021 / pH: 6.5  Gluc: >1000 / Ketone: NEGATIVE  / Bili: NEGATIVE / Urobili: NORMAL   Blood: NEGATIVE / Protein: 200 / Nitrite: NEGATIVE   Leuk Esterase: NEGATIVE / RBC: 0-2 / WBC 3-5   Sq Epi: FEW / Non Sq Epi: x / Bacteria: NEGATIVE        RADIOLOGY & ADDITIONAL TESTS:    Imaging Personally Reviewed:    Consultant(s) Notes Reviewed:      Care Discussed with Consultants/Other Providers: [facility] pharmacist   Venlofax - last filled 9/10, atorvastatin 20mg, omeprazole 40mg, vasaglar 40 units - last filled end of  (concern about insurance issues), lexapro 20 - hasnt filled since May 23, Dr. Her recently sent  50 units glargine at bedtime and 10 units pre-meal - however, medication never filled.

## 2019-10-19 PROCEDURE — 99231 SBSQ HOSP IP/OBS SF/LOW 25: CPT

## 2019-10-19 RX ADMIN — INSULIN GLARGINE 25 UNIT(S): 100 INJECTION, SOLUTION SUBCUTANEOUS at 21:15

## 2019-10-19 RX ADMIN — Medication 10: at 16:59

## 2019-10-19 RX ADMIN — ATORVASTATIN CALCIUM 20 MILLIGRAM(S): 80 TABLET, FILM COATED ORAL at 21:22

## 2019-10-19 RX ADMIN — Medication 4: at 09:43

## 2019-10-19 RX ADMIN — AMLODIPINE BESYLATE 5 MILLIGRAM(S): 2.5 TABLET ORAL at 09:43

## 2019-10-19 RX ADMIN — Medication 10 UNIT(S): at 12:11

## 2019-10-19 RX ADMIN — MONTELUKAST 10 MILLIGRAM(S): 4 TABLET, CHEWABLE ORAL at 09:45

## 2019-10-19 RX ADMIN — Medication 37.5 MILLIGRAM(S): at 09:46

## 2019-10-19 RX ADMIN — Medication 10 UNIT(S): at 09:45

## 2019-10-19 RX ADMIN — Medication 4: at 21:16

## 2019-10-19 RX ADMIN — Medication 10 UNIT(S): at 17:02

## 2019-10-20 PROCEDURE — 99231 SBSQ HOSP IP/OBS SF/LOW 25: CPT

## 2019-10-20 RX ORDER — VENLAFAXINE HCL 75 MG
75 CAPSULE, EXT RELEASE 24 HR ORAL DAILY
Refills: 0 | Status: DISCONTINUED | OUTPATIENT
Start: 2019-10-20 | End: 2019-10-24

## 2019-10-20 RX ORDER — PETROLATUM,WHITE
1 JELLY (GRAM) TOPICAL EVERY 12 HOURS
Refills: 0 | Status: DISCONTINUED | OUTPATIENT
Start: 2019-10-20 | End: 2019-11-08

## 2019-10-20 RX ADMIN — INSULIN GLARGINE 25 UNIT(S): 100 INJECTION, SOLUTION SUBCUTANEOUS at 21:36

## 2019-10-20 RX ADMIN — Medication 2: at 17:01

## 2019-10-20 RX ADMIN — ATORVASTATIN CALCIUM 20 MILLIGRAM(S): 80 TABLET, FILM COATED ORAL at 21:36

## 2019-10-20 RX ADMIN — MONTELUKAST 10 MILLIGRAM(S): 4 TABLET, CHEWABLE ORAL at 08:48

## 2019-10-20 RX ADMIN — Medication 2: at 08:56

## 2019-10-20 RX ADMIN — Medication 10 UNIT(S): at 17:01

## 2019-10-20 RX ADMIN — Medication 2: at 12:45

## 2019-10-20 RX ADMIN — Medication 10 UNIT(S): at 08:55

## 2019-10-20 RX ADMIN — Medication 10 UNIT(S): at 12:45

## 2019-10-20 RX ADMIN — AMLODIPINE BESYLATE 5 MILLIGRAM(S): 2.5 TABLET ORAL at 08:48

## 2019-10-20 RX ADMIN — Medication 37.5 MILLIGRAM(S): at 08:48

## 2019-10-21 PROCEDURE — 99231 SBSQ HOSP IP/OBS SF/LOW 25: CPT

## 2019-10-21 PROCEDURE — 70551 MRI BRAIN STEM W/O DYE: CPT | Mod: 26

## 2019-10-21 PROCEDURE — 99232 SBSQ HOSP IP/OBS MODERATE 35: CPT

## 2019-10-21 RX ADMIN — ATORVASTATIN CALCIUM 20 MILLIGRAM(S): 80 TABLET, FILM COATED ORAL at 21:09

## 2019-10-21 RX ADMIN — Medication 75 MILLIGRAM(S): at 09:19

## 2019-10-21 RX ADMIN — Medication 10 UNIT(S): at 08:57

## 2019-10-21 RX ADMIN — AMLODIPINE BESYLATE 5 MILLIGRAM(S): 2.5 TABLET ORAL at 09:19

## 2019-10-21 RX ADMIN — INSULIN GLARGINE 25 UNIT(S): 100 INJECTION, SOLUTION SUBCUTANEOUS at 21:09

## 2019-10-21 RX ADMIN — Medication 10: at 12:47

## 2019-10-21 RX ADMIN — Medication 10 UNIT(S): at 12:48

## 2019-10-21 RX ADMIN — MONTELUKAST 10 MILLIGRAM(S): 4 TABLET, CHEWABLE ORAL at 09:19

## 2019-10-21 RX ADMIN — Medication 1 APPLICATION(S): at 21:09

## 2019-10-21 RX ADMIN — Medication 6: at 21:09

## 2019-10-21 NOTE — PROGRESS NOTE ADULT - ASSESSMENT
63 yo woman with history of depression, poorly controlled Type 2 Diabetes - 15.9 - 10/17 , HTN, HLD, CKD stage 3, initially presented to [facility] with elevated fingerstick. She was found to have FS in the 400s, no ketones,  admitted with [facility], found to have depression transferred to [facility] for further management. Medicine consulted for diabetic co-management.     #DM type 2 poorly controlled - A1c of 15.9   - Continue with  Glargine 25 units QHS  - Will continue pre-meal 10 units in addition to correction dose sliding scale   - Will monitor fingersticks, goals (FS <180)  and adjust as needed   - Per collateral obtained form pharmacy, it appears that Dr. Andrews adjusted medication - lantus 50 units QHS and 10 premeal. However, due to insurance issues never filled.   - Diabetic diet   - Diabetic nutrition consult, appreciate recs   - Discharge dose - likely 25 units QHS and 10-12 units pre-meal - Pt. will need new prescription's      #HTN - at goal 110/59   -  holding lisinopril   - continue amlodipine 5mg; will uptitrate as tolerated     #HLD   - Continue atorvastatin    #CKD stage 3 - unclear baseline (appears to be around 1.9)   -Monitor BMP   - In the ED, patient received 1L IVF  - Holding lisinopril   - Stable - 1.81 - on 10/18   #Depression   - Per psychiatry

## 2019-10-21 NOTE — PROGRESS NOTE ADULT - SUBJECTIVE AND OBJECTIVE BOX
CC/Reason for Consult: Diabetes     SUBJECTIVE / OVERNIGHT EVENTS:Patient's finger stick under better control. AM fingerstick at goal. This am, patient continues to express desire to go home to take care of her cats. Discussed current diabetes regimen. Pt. denies any issues with taking insulin or insurance issue. Discussed current basal/bolus dosing of insulin (25 QHS and 10-12 pre-meal). Pt. expressed understanding and ability to comply with regimen.     MEDICATIONS  (STANDING):  amLODIPine   Tablet 5 milliGRAM(s) Oral daily  atorvastatin 20 milliGRAM(s) Oral at bedtime  dextrose 50% Injectable 25 Gram(s) IV Push once  insulin glargine Injectable (LANTUS) 25 Unit(s) SubCutaneous at bedtime  insulin lispro (HumaLOG) corrective regimen sliding scale   SubCutaneous three times a day before meals  insulin lispro (HumaLOG) corrective regimen sliding scale   SubCutaneous at bedtime  insulin lispro Injectable (HumaLOG) 10 Unit(s) SubCutaneous three times a day before meals  montelukast 10 milliGRAM(s) Oral daily  petrolatum white Ointment 1 Application(s) Topical every 12 hours  triamcinolone 0.1% Cream 1 Application(s) Topical two times a day  venlafaxine XR 75 milliGRAM(s) Oral daily    MEDICATIONS  (PRN):  glucagon  Injectable 1 milliGRAM(s) IntraMuscular once PRN Glucose LESS THAN 70 milligrams/deciliter  LORazepam     Tablet 1 milliGRAM(s) Oral every 6 hours PRN Agitation  LORazepam   Injectable 1 milliGRAM(s) IntraMuscular every 6 hours PRN Agitation      Vital Signs Last 24 Hrs  T(C): 36.8 (21 Oct 2019 08:52), Max: 37.3 (20 Oct 2019 19:50)  T(F): 98.3 (21 Oct 2019 08:52), Max: 99.2 (20 Oct 2019 19:50)  HR: --  BP: --  BP(mean): -- 110/69  RR: 18 (21 Oct 2019 08:52) (18 - 20)  SpO2: --  CAPILLARY BLOOD GLUCOSE      POCT Blood Glucose.: 124 mg/dL (21 Oct 2019 08:26)  POCT Blood Glucose.: 250 mg/dL (20 Oct 2019 20:21)  POCT Blood Glucose.: 189 mg/dL (20 Oct 2019 16:23)        PHYSICAL EXAM:  GENERAL: Elderly woman in  NAD, well-developed  HEAD:  Atraumatic, Normocephalic  EYES: EOMI, conjunctiva and sclera clear  NECK: Supple, No JVD  CHEST/LUNG: Clear to auscultation bilaterally; No wheeze  HEART: Regular rate and rhythm; No murmurs, rubs, or gallops  ABDOMEN: Soft, Nontender, Nondistended; Bowel sounds present  EXTREMITIES:  2+ Peripheral Pulses, No clubbing, cyanosis, or edema  PSYCH: AAOx3  NEUROLOGY: non-focal  SKIN: No rashes or lesions    LABS:                    RADIOLOGY & ADDITIONAL TESTS:    Imaging Personally Reviewed:    Consultant(s) Notes Reviewed:      Care Discussed with Consultants/Other Providers:

## 2019-10-22 PROCEDURE — 99231 SBSQ HOSP IP/OBS SF/LOW 25: CPT

## 2019-10-22 PROCEDURE — 99233 SBSQ HOSP IP/OBS HIGH 50: CPT

## 2019-10-22 RX ORDER — INSULIN GLARGINE 100 [IU]/ML
28 INJECTION, SOLUTION SUBCUTANEOUS AT BEDTIME
Refills: 0 | Status: DISCONTINUED | OUTPATIENT
Start: 2019-10-22 | End: 2019-10-24

## 2019-10-22 RX ADMIN — AMLODIPINE BESYLATE 5 MILLIGRAM(S): 2.5 TABLET ORAL at 09:03

## 2019-10-22 RX ADMIN — ATORVASTATIN CALCIUM 20 MILLIGRAM(S): 80 TABLET, FILM COATED ORAL at 23:05

## 2019-10-22 RX ADMIN — INSULIN GLARGINE 28 UNIT(S): 100 INJECTION, SOLUTION SUBCUTANEOUS at 22:02

## 2019-10-22 RX ADMIN — Medication 10 UNIT(S): at 17:54

## 2019-10-22 RX ADMIN — MONTELUKAST 10 MILLIGRAM(S): 4 TABLET, CHEWABLE ORAL at 09:00

## 2019-10-22 RX ADMIN — Medication 10 UNIT(S): at 13:19

## 2019-10-22 RX ADMIN — Medication 6: at 09:01

## 2019-10-22 RX ADMIN — Medication 2: at 22:03

## 2019-10-22 RX ADMIN — Medication 10 UNIT(S): at 08:59

## 2019-10-22 RX ADMIN — Medication 75 MILLIGRAM(S): at 09:01

## 2019-10-22 NOTE — PROGRESS NOTE ADULT - ASSESSMENT
63 yo woman with history of depression, poorly controlled Type 2 Diabetes - 15.9 - 10/17 , HTN, HLD, CKD stage 3, initially presented to [place] with elevated fingerstick. She was found to have FS in the 400s, no ketones,  admitted with [facility], found to have depression transferred to [facility] for further management. Medicine consulted for diabetic co-management.     #DM type 2 poorly controlled - A1c of 15.9; finger sticks - above goal will increase lantus   - Increase  Glargine to 28 units from  25 units QHS  - Will continue pre-meal 10 units in addition to correction dose sliding scale   - Will monitor fingersticks, goals (FS <180)  and adjust as needed   - Per collateral obtained form pharmacy, it appears that Dr. Andrews adjusted medication - lantus 50 units QHS and 10 premeal. However, due to insurance issues never filled.   - Diabetic diet   - Diabetic nutrition consult, appreciate recs   - Discharge dose - likely 28  units QHS and 10-12 units pre-meal - Pt. will need new prescription's      #HTN - at goal 110/59   -  holding lisinopril   - continue amlodipine 5mg; will uptitrate as tolerated     #HLD   - Continue atorvastatin    #CKD stage 3 - unclear baseline (appears to be around 1.9)   -Monitor BMP   - In the ED, patient received 1L IVF  - Holding lisinopril   - Stable - 1.81 - on 10/18   #Depression   - Per psychiatry

## 2019-10-22 NOTE — PROGRESS NOTE ADULT - SUBJECTIVE AND OBJECTIVE BOX
CC/Reason for Consult: Diabetes management.     SUBJECTIVE / OVERNIGHT EVENTS: Patient's diabetes is above goal, fs ranging from 200s-350s. Over 24 hours received, 61 units of insulin - 25 units long acting. This am, patient reports.     MEDICATIONS  (STANDING):  amLODIPine   Tablet 5 milliGRAM(s) Oral daily  atorvastatin 20 milliGRAM(s) Oral at bedtime  dextrose 50% Injectable 25 Gram(s) IV Push once  insulin glargine Injectable (LANTUS) 25 Unit(s) SubCutaneous at bedtime  insulin lispro (HumaLOG) corrective regimen sliding scale   SubCutaneous three times a day before meals  insulin lispro (HumaLOG) corrective regimen sliding scale   SubCutaneous at bedtime  insulin lispro Injectable (HumaLOG) 10 Unit(s) SubCutaneous three times a day before meals  montelukast 10 milliGRAM(s) Oral daily  petrolatum white Ointment 1 Application(s) Topical every 12 hours  triamcinolone 0.1% Cream 1 Application(s) Topical two times a day  venlafaxine XR 75 milliGRAM(s) Oral daily    MEDICATIONS  (PRN):  glucagon  Injectable 1 milliGRAM(s) IntraMuscular once PRN Glucose LESS THAN 70 milligrams/deciliter  LORazepam     Tablet 1 milliGRAM(s) Oral every 6 hours PRN Agitation  LORazepam   Injectable 1 milliGRAM(s) IntraMuscular every 6 hours PRN Agitation      Vital Signs Last 24 Hrs  T(C): 36.6 (22 Oct 2019 08:00), Max: 36.6 (22 Oct 2019 08:00)  T(F): 97.8 (22 Oct 2019 08:00), Max: 97.8 (22 Oct 2019 08:00)  HR: --  BP: --  BP(mean): --  RR: 18 (22 Oct 2019 08:00) (18 - 18)  SpO2: --  CAPILLARY BLOOD GLUCOSE      POCT Blood Glucose.: 274 mg/dL (22 Oct 2019 07:41)  POCT Blood Glucose.: 355 mg/dL (21 Oct 2019 20:37)  POCT Blood Glucose.: 164 mg/dL (21 Oct 2019 16:35)  POCT Blood Glucose.: 394 mg/dL (21 Oct 2019 12:16)        PHYSICAL EXAM:  GENERAL: NAD, well-developed  HEAD:  Atraumatic, Normocephalic  EYES: EOMI, conjunctiva and sclera clear  NECK: Supple, No JVD  CHEST/LUNG: Clear to auscultation bilaterally; No wheeze  HEART: Regular rate and rhythm; No murmurs, rubs, or gallops  ABDOMEN: Soft, Nontender, Nondistended; Bowel sounds present  EXTREMITIES:  2+ Peripheral Pulses, No clubbing, cyanosis, or edema  PSYCH: AAOx3  NEUROLOGY: non-focal  SKIN: No rashes or lesions    LABS:                    RADIOLOGY & ADDITIONAL TESTS:    Imaging Personally Reviewed:    Consultant(s) Notes Reviewed:      Care Discussed with Consultants/Other Providers: CC/Reason for Consult: Diabetes management.     SUBJECTIVE / OVERNIGHT EVENTS: Patient's diabetes is above goal, fs ranging from 200s-350s. Over 24 hours received, 61 units of insulin - 25 units long acting. This am, patient requesting to go home. Denies any chest pain, f/c/d/c/, abdominal pain.     MEDICATIONS  (STANDING):  amLODIPine   Tablet 5 milliGRAM(s) Oral daily  atorvastatin 20 milliGRAM(s) Oral at bedtime  dextrose 50% Injectable 25 Gram(s) IV Push once  insulin glargine Injectable (LANTUS) 25 Unit(s) SubCutaneous at bedtime  insulin lispro (HumaLOG) corrective regimen sliding scale   SubCutaneous three times a day before meals  insulin lispro (HumaLOG) corrective regimen sliding scale   SubCutaneous at bedtime  insulin lispro Injectable (HumaLOG) 10 Unit(s) SubCutaneous three times a day before meals  montelukast 10 milliGRAM(s) Oral daily  petrolatum white Ointment 1 Application(s) Topical every 12 hours  triamcinolone 0.1% Cream 1 Application(s) Topical two times a day  venlafaxine XR 75 milliGRAM(s) Oral daily    MEDICATIONS  (PRN):  glucagon  Injectable 1 milliGRAM(s) IntraMuscular once PRN Glucose LESS THAN 70 milligrams/deciliter  LORazepam     Tablet 1 milliGRAM(s) Oral every 6 hours PRN Agitation  LORazepam   Injectable 1 milliGRAM(s) IntraMuscular every 6 hours PRN Agitation      Vital Signs Last 24 Hrs  T(C): 36.6 (22 Oct 2019 08:00), Max: 36.6 (22 Oct 2019 08:00)  T(F): 97.8 (22 Oct 2019 08:00), Max: 97.8 (22 Oct 2019 08:00)  HR: --  BP: -- 118/83  BP(mean): --  RR: 18 (22 Oct 2019 08:00) (18 - 18)  SpO2: --  CAPILLARY BLOOD GLUCOSE      POCT Blood Glucose.: 274 mg/dL (22 Oct 2019 07:41)  POCT Blood Glucose.: 355 mg/dL (21 Oct 2019 20:37)  POCT Blood Glucose.: 164 mg/dL (21 Oct 2019 16:35)  POCT Blood Glucose.: 394 mg/dL (21 Oct 2019 12:16)        PHYSICAL EXAM:  GENERAL: Middle age woman in NAD, well-developed  HEAD:  Atraumatic, Normocephalic  EYES: EOMI, conjunctiva and sclera clear  NECK: Supple, No JVD  CHEST/LUNG: Clear to auscultation bilaterally; No wheeze  HEART: Regular rate and rhythm; No murmurs, rubs, or gallops  ABDOMEN: Soft, Nontender, Nondistended; Bowel sounds present  EXTREMITIES:  2+ Peripheral Pulses, No clubbing, cyanosis, or edema  PSYCH: flat affect, calm, answers questions appropriately   NEUROLOGY: non-focal  SKIN: No rashes or lesions    LABS:                    RADIOLOGY & ADDITIONAL TESTS:    Imaging Personally Reviewed: < from: MR Head No Cont (10.21.19 @ 18:33) >    IMPRESSION:    Motion limited study without gross evidence for acute intracranial   abnormality.     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        < end of copied text >      Consultant(s) Notes Reviewed:      Care Discussed with Consultants/Other Providers:

## 2019-10-23 PROCEDURE — 99231 SBSQ HOSP IP/OBS SF/LOW 25: CPT

## 2019-10-23 PROCEDURE — 99232 SBSQ HOSP IP/OBS MODERATE 35: CPT

## 2019-10-23 RX ORDER — INSULIN LISPRO 100/ML
12 VIAL (ML) SUBCUTANEOUS
Refills: 0 | Status: DISCONTINUED | OUTPATIENT
Start: 2019-10-23 | End: 2019-10-25

## 2019-10-23 RX ADMIN — ATORVASTATIN CALCIUM 20 MILLIGRAM(S): 80 TABLET, FILM COATED ORAL at 21:31

## 2019-10-23 RX ADMIN — INSULIN GLARGINE 28 UNIT(S): 100 INJECTION, SOLUTION SUBCUTANEOUS at 21:30

## 2019-10-23 RX ADMIN — Medication 1 APPLICATION(S): at 09:38

## 2019-10-23 RX ADMIN — Medication 4: at 08:15

## 2019-10-23 RX ADMIN — Medication 12 UNIT(S): at 17:04

## 2019-10-23 RX ADMIN — AMLODIPINE BESYLATE 5 MILLIGRAM(S): 2.5 TABLET ORAL at 09:38

## 2019-10-23 RX ADMIN — Medication 10 UNIT(S): at 08:15

## 2019-10-23 RX ADMIN — Medication 8: at 17:03

## 2019-10-23 RX ADMIN — Medication 0: at 21:10

## 2019-10-23 RX ADMIN — Medication 1 APPLICATION(S): at 21:32

## 2019-10-23 RX ADMIN — Medication 75 MILLIGRAM(S): at 09:38

## 2019-10-23 RX ADMIN — MONTELUKAST 10 MILLIGRAM(S): 4 TABLET, CHEWABLE ORAL at 09:38

## 2019-10-23 NOTE — PROGRESS NOTE ADULT - ASSESSMENT
63 yo woman with history of depression, poorly controlled Type 2 Diabetes - 15.9 - 10/17 , HTN, HLD, CKD stage 3, initially presented to [place] with elevated fingerstick. She was found to have FS in the 400s, no ketones,  admitted with [facility], found to have depression transferred to [facility] for further management. Medicine consulted for diabetic co-management.     #DM type 2 poorly controlled - A1c of 15.9; finger sticks - above goal will increase lantus   - Increase  Glargine to 28 units from  25 units QHS  - Will continue pre-meal 10 units in addition to correction dose sliding scale   - Will monitor fingersticks, goals (FS <180)  and adjust as needed   - Per collateral obtained form pharmacy, it appears that Dr. Andrews adjusted medication - lantus 50 units QHS and 10 premeal. However, due to insurance issues never filled.   - Diabetic diet   - Diabetic nutrition consult, appreciate recs   - Discharge dose - likely 28  units QHS and 10-12 units pre-meal - Pt. will need new prescription's      #HTN - at goal 110/59   -  holding lisinopril   - continue amlodipine 5mg; will uptitrate as tolerated     #HLD   - Continue atorvastatin    #CKD stage 3 - unclear baseline (appears to be around 1.9)   -Monitor BMP   - In the ED, patient received 1L IVF  - Holding lisinopril   - Stable - 1.81 - on 10/18   #Depression   - Per psychiatry 63 yo woman with history of depression, poorly controlled Type 2 Diabetes - 15.9 - 10/17 , HTN, HLD, CKD stage 3, initially presented to [place] with elevated fingerstick. She was found to have FS in the 400s, no ketones,  admitted with [facility], found to have depression transferred to [facility] for further management. Medicine consulted for diabetic co-management.     #DM type 2 poorly controlled - A1c of 15.9; finger sticks - above goal will increase lantus   - Continue  Glargine to 28 units QHS  - Will increase pre-meal 12 units from 10 units in addition to correction dose sliding scale   - Will monitor fingersticks, goals (FS <180)  and adjust as needed   - Per collateral obtained form pharmacy, it appears that Dr. Andrews adjusted medication - lantus 50 units QHS and 10 premeal. However, due to insurance issues never filled.   - Diabetic diet   - Diabetic nutrition consult, appreciate recs   - Discharge dose - likely 28  units QHS and 10-12 units pre-meal - Pt. will need new prescription's      #HTN - at goal 110/59   -  holding lisinopril   - continue amlodipine 5mg; will uptitrate as tolerated     #HLD   - Continue atorvastatin    #CKD stage 3 - unclear baseline (appears to be around 1.9) - most likely secondary to diabetic nephropathy -  -Monitor BMP   - In the ED, patient received 1L IVF  - Holding lisinopril   - Stable - 1.81 - on 10/18   #Depression   - Per psychiatry

## 2019-10-23 NOTE — PROGRESS NOTE ADULT - SUBJECTIVE AND OBJECTIVE BOX
CC/Reason for Consult: Diabetes     SUBJECTIVE / OVERNIGHT EVENTS: This am, patient's finger sticks above goal     MEDICATIONS  (STANDING):  amLODIPine   Tablet 5 milliGRAM(s) Oral daily  atorvastatin 20 milliGRAM(s) Oral at bedtime  dextrose 50% Injectable 25 Gram(s) IV Push once  insulin glargine Injectable (LANTUS) 28 Unit(s) SubCutaneous at bedtime  insulin lispro (HumaLOG) corrective regimen sliding scale   SubCutaneous three times a day before meals  insulin lispro (HumaLOG) corrective regimen sliding scale   SubCutaneous at bedtime  insulin lispro Injectable (HumaLOG) 10 Unit(s) SubCutaneous three times a day before meals  montelukast 10 milliGRAM(s) Oral daily  petrolatum white Ointment 1 Application(s) Topical every 12 hours  triamcinolone 0.1% Cream 1 Application(s) Topical two times a day  venlafaxine XR 75 milliGRAM(s) Oral daily    MEDICATIONS  (PRN):  glucagon  Injectable 1 milliGRAM(s) IntraMuscular once PRN Glucose LESS THAN 70 milligrams/deciliter  LORazepam     Tablet 1 milliGRAM(s) Oral every 6 hours PRN Agitation  LORazepam   Injectable 1 milliGRAM(s) IntraMuscular every 6 hours PRN Agitation      Vital Signs Last 24 Hrs  T(C): 36.8 (23 Oct 2019 09:01), Max: 36.9 (22 Oct 2019 20:09)  T(F): 98.2 (23 Oct 2019 09:01), Max: 98.4 (22 Oct 2019 20:09)  HR: --  BP: 98/60 (22 Oct 2019 20:09) (98/60 - 98/60)  BP(mean): 92 (22 Oct 2019 20:09) (92 - 92)  RR: 18 (23 Oct 2019 09:01) (18 - 18)  SpO2: --  CAPILLARY BLOOD GLUCOSE      POCT Blood Glucose.: 219 mg/dL (23 Oct 2019 07:45)  POCT Blood Glucose.: 258 mg/dL (22 Oct 2019 21:19)  POCT Blood Glucose.: 264 mg/dL (22 Oct 2019 20:30)  POCT Blood Glucose.: 142 mg/dL (22 Oct 2019 17:23)  POCT Blood Glucose.: 136 mg/dL (22 Oct 2019 12:26)        PHYSICAL EXAM:  GENERAL: NAD, well-developed  HEAD:  Atraumatic, Normocephalic  EYES: EOMI, conjunctiva and sclera clear  NECK: Supple, No JVD  CHEST/LUNG: Clear to auscultation bilaterally; No wheeze  HEART: Regular rate and rhythm; No murmurs, rubs, or gallops  ABDOMEN: Soft, Nontender, Nondistended; Bowel sounds present  EXTREMITIES:  2+ Peripheral Pulses, No clubbing, cyanosis, or edema  PSYCH: AAOx3  NEUROLOGY: non-focal  SKIN: No rashes or lesions    LABS:                    RADIOLOGY & ADDITIONAL TESTS:    Imaging Personally Reviewed:    Consultant(s) Notes Reviewed:      Care Discussed with Consultants/Other Providers: CC/Reason for Consult: Diabetes     SUBJECTIVE / OVERNIGHT EVENTS: This am, patient's finger sticks above goal. Patient denies any snacking. Unclear about increased carbohydrate intake - states that she eats what she is given. However, patient states that her meals are similar to what she eats outside.     MEDICATIONS  (STANDING):  amLODIPine   Tablet 5 milliGRAM(s) Oral daily  atorvastatin 20 milliGRAM(s) Oral at bedtime  dextrose 50% Injectable 25 Gram(s) IV Push once  insulin glargine Injectable (LANTUS) 28 Unit(s) SubCutaneous at bedtime  insulin lispro (HumaLOG) corrective regimen sliding scale   SubCutaneous three times a day before meals  insulin lispro (HumaLOG) corrective regimen sliding scale   SubCutaneous at bedtime  insulin lispro Injectable (HumaLOG) 10 Unit(s) SubCutaneous three times a day before meals  montelukast 10 milliGRAM(s) Oral daily  petrolatum white Ointment 1 Application(s) Topical every 12 hours  triamcinolone 0.1% Cream 1 Application(s) Topical two times a day  venlafaxine XR 75 milliGRAM(s) Oral daily    MEDICATIONS  (PRN):  glucagon  Injectable 1 milliGRAM(s) IntraMuscular once PRN Glucose LESS THAN 70 milligrams/deciliter  LORazepam     Tablet 1 milliGRAM(s) Oral every 6 hours PRN Agitation  LORazepam   Injectable 1 milliGRAM(s) IntraMuscular every 6 hours PRN Agitation      Vital Signs Last 24 Hrs  T(C): 36.8 (23 Oct 2019 09:01), Max: 36.9 (22 Oct 2019 20:09)  T(F): 98.2 (23 Oct 2019 09:01), Max: 98.4 (22 Oct 2019 20:09)  HR: --  BP: 98/60 (22 Oct 2019 20:09) (98/60 - 98/60)  BP(mean): 92 (22 Oct 2019 20:09) (92 - 92)  RR: 18 (23 Oct 2019 09:01) (18 - 18)  SpO2: --        PHYSICAL EXAM:  GENERAL: Middle age woman who appears older than her stated age  NAD, well-developed  HEAD:  Atraumatic, Normocephalic  EYES: EOMI, conjunctiva and sclera clear  NECK: Supple, No JVD  CHEST/LUNG: Clear to auscultation bilaterally; No wheeze  HEART: Regular rate and rhythm; No murmurs, rubs, or gallops  ABDOMEN: Soft, Nontender, Nondistended; Bowel sounds present  EXTREMITIES:  2+ Peripheral Pulses, No clubbing, cyanosis, or edema  PSYCH: calm, answering questions appropriately   NEUROLOGY: non-focal  SKIN: No rashes or lesions    LABS:        CAPILLARY BLOOD GLUCOSE      POCT Blood Glucose.: 219 mg/dL (23 Oct 2019 07:45)  POCT Blood Glucose.: 258 mg/dL (22 Oct 2019 21:19)  POCT Blood Glucose.: 264 mg/dL (22 Oct 2019 20:30)  POCT Blood Glucose.: 142 mg/dL (22 Oct 2019 17:23)  POCT Blood Glucose.: 136 mg/dL (22 Oct 2019 12:26)              RADIOLOGY & ADDITIONAL TESTS:    Imaging Personally Reviewed:    Consultant(s) Notes Reviewed:      Care Discussed with Consultants/Other Providers:

## 2019-10-24 PROCEDURE — 99231 SBSQ HOSP IP/OBS SF/LOW 25: CPT

## 2019-10-24 PROCEDURE — 99233 SBSQ HOSP IP/OBS HIGH 50: CPT

## 2019-10-24 RX ORDER — VENLAFAXINE HCL 75 MG
112.5 CAPSULE, EXT RELEASE 24 HR ORAL DAILY
Refills: 0 | Status: DISCONTINUED | OUTPATIENT
Start: 2019-10-24 | End: 2019-10-28

## 2019-10-24 RX ORDER — LISINOPRIL 2.5 MG/1
2.5 TABLET ORAL DAILY
Refills: 0 | Status: DISCONTINUED | OUTPATIENT
Start: 2019-10-24 | End: 2019-10-29

## 2019-10-24 RX ORDER — INSULIN GLARGINE 100 [IU]/ML
30 INJECTION, SOLUTION SUBCUTANEOUS AT BEDTIME
Refills: 0 | Status: DISCONTINUED | OUTPATIENT
Start: 2019-10-24 | End: 2019-10-25

## 2019-10-24 RX ADMIN — MONTELUKAST 10 MILLIGRAM(S): 4 TABLET, CHEWABLE ORAL at 08:37

## 2019-10-24 RX ADMIN — Medication 4: at 08:36

## 2019-10-24 RX ADMIN — Medication 1 APPLICATION(S): at 21:03

## 2019-10-24 RX ADMIN — Medication 75 MILLIGRAM(S): at 08:37

## 2019-10-24 RX ADMIN — INSULIN GLARGINE 30 UNIT(S): 100 INJECTION, SOLUTION SUBCUTANEOUS at 21:03

## 2019-10-24 RX ADMIN — Medication 1 APPLICATION(S): at 08:37

## 2019-10-24 RX ADMIN — LISINOPRIL 2.5 MILLIGRAM(S): 2.5 TABLET ORAL at 12:50

## 2019-10-24 RX ADMIN — Medication 12 UNIT(S): at 08:37

## 2019-10-24 RX ADMIN — AMLODIPINE BESYLATE 5 MILLIGRAM(S): 2.5 TABLET ORAL at 08:36

## 2019-10-24 RX ADMIN — Medication 12 UNIT(S): at 12:50

## 2019-10-24 RX ADMIN — ATORVASTATIN CALCIUM 20 MILLIGRAM(S): 80 TABLET, FILM COATED ORAL at 21:02

## 2019-10-24 NOTE — PROGRESS NOTE ADULT - SUBJECTIVE AND OBJECTIVE BOX
CC/Reason for Consult: Diabetes management     SUBJECTIVE / OVERNIGHT EVENTS: Patient's blood sugars above goal. Yesterday, received a total of 76 insulin     MEDICATIONS  (STANDING):  amLODIPine   Tablet 5 milliGRAM(s) Oral daily  atorvastatin 20 milliGRAM(s) Oral at bedtime  dextrose 50% Injectable 25 Gram(s) IV Push once  insulin glargine Injectable (LANTUS) 28 Unit(s) SubCutaneous at bedtime  insulin lispro (HumaLOG) corrective regimen sliding scale   SubCutaneous three times a day before meals  insulin lispro (HumaLOG) corrective regimen sliding scale   SubCutaneous at bedtime  insulin lispro Injectable (HumaLOG) 12 Unit(s) SubCutaneous three times a day before meals  montelukast 10 milliGRAM(s) Oral daily  petrolatum white Ointment 1 Application(s) Topical every 12 hours  triamcinolone 0.1% Cream 1 Application(s) Topical two times a day  venlafaxine XR 75 milliGRAM(s) Oral daily    MEDICATIONS  (PRN):  glucagon  Injectable 1 milliGRAM(s) IntraMuscular once PRN Glucose LESS THAN 70 milligrams/deciliter  LORazepam     Tablet 1 milliGRAM(s) Oral every 6 hours PRN Agitation  LORazepam   Injectable 1 milliGRAM(s) IntraMuscular every 6 hours PRN Agitation      Vital Signs Last 24 Hrs  T(C): 36.9 (24 Oct 2019 08:23), Max: 36.9 (24 Oct 2019 08:23)  T(F): 98.4 (24 Oct 2019 08:23), Max: 98.4 (24 Oct 2019 08:23)  HR: --  BP: --  BP(mean): --  RR: 17 (24 Oct 2019 08:23) (17 - 17)  SpO2: --  CAPILLARY BLOOD GLUCOSE      POCT Blood Glucose.: 208 mg/dL (24 Oct 2019 07:55)  POCT Blood Glucose.: 118 mg/dL (23 Oct 2019 20:48)  POCT Blood Glucose.: 335 mg/dL (23 Oct 2019 16:51)  POCT Blood Glucose.: 115 mg/dL (23 Oct 2019 12:17)        PHYSICAL EXAM:  GENERAL: NAD, well-developed  HEAD:  Atraumatic, Normocephalic  EYES: EOMI, conjunctiva and sclera clear  NECK: Supple, No JVD  CHEST/LUNG: Clear to auscultation bilaterally; No wheeze  HEART: Regular rate and rhythm; No murmurs, rubs, or gallops  ABDOMEN: Soft, Nontender, Nondistended; Bowel sounds present  EXTREMITIES:  2+ Peripheral Pulses, No clubbing, cyanosis, or edema  PSYCH: AAOx3  NEUROLOGY: non-focal  SKIN: No rashes or lesions    LABS:                    RADIOLOGY & ADDITIONAL TESTS:    Imaging Personally Reviewed:    Consultant(s) Notes Reviewed:      Care Discussed with Consultants/Other Providers: CC/Reason for Consult: Diabetes management     SUBJECTIVE / OVERNIGHT EVENTS: Patient's blood sugars above goal. Yesterday, received a total of 76 insulin. Patient examined this am. Pt. was making a cup of coffee, using two packets of sugar. Discussed elevated blood sugars and plan to decreased carbs and sugar intake. Pt. expressed frustration with hospitalization. Discussed plan to increase insulin as well as restart glargine. pt. amenable to plan.     MEDICATIONS  (STANDING):  amLODIPine   Tablet 5 milliGRAM(s) Oral daily  atorvastatin 20 milliGRAM(s) Oral at bedtime  dextrose 50% Injectable 25 Gram(s) IV Push once  insulin glargine Injectable (LANTUS) 28 Unit(s) SubCutaneous at bedtime  insulin lispro (HumaLOG) corrective regimen sliding scale   SubCutaneous three times a day before meals  insulin lispro (HumaLOG) corrective regimen sliding scale   SubCutaneous at bedtime  insulin lispro Injectable (HumaLOG) 12 Unit(s) SubCutaneous three times a day before meals  montelukast 10 milliGRAM(s) Oral daily  petrolatum white Ointment 1 Application(s) Topical every 12 hours  triamcinolone 0.1% Cream 1 Application(s) Topical two times a day  venlafaxine XR 75 milliGRAM(s) Oral daily    MEDICATIONS  (PRN):  glucagon  Injectable 1 milliGRAM(s) IntraMuscular once PRN Glucose LESS THAN 70 milligrams/deciliter  LORazepam     Tablet 1 milliGRAM(s) Oral every 6 hours PRN Agitation  LORazepam   Injectable 1 milliGRAM(s) IntraMuscular every 6 hours PRN Agitation      Vital Signs Last 24 Hrs  T(C): 36.9 (24 Oct 2019 08:23), Max: 36.9 (24 Oct 2019 08:23)  T(F): 98.4 (24 Oct 2019 08:23), Max: 98.4 (24 Oct 2019 08:23)  HR: --  BP: -- 114/86  BP(mean): --  RR: 17 (24 Oct 2019 08:23) (17 - 17)  SpO2: --  CAPILLARY BLOOD GLUCOSE            PHYSICAL EXAM:  GENERAL: Elderly woman in NAD, well-developed  HEAD:  Atraumatic, Normocephalic  EYES: EOMI, conjunctiva and sclera clear  NECK: Supple, No JVD  CHEST/LUNG: Clear to auscultation bilaterally; No wheeze  HEART: Regular rate and rhythm; No murmurs, rubs, or gallops  ABDOMEN: Soft, Nontender, Nondistended; Bowel sounds present  EXTREMITIES:  2+ Peripheral Pulses, No clubbing, cyanosis, or edema  PSYCH: answering questions appropriately, irritated throughout interview   NEUROLOGY: non-focal  SKIN: No rashes or lesions    LABS:        POCT Blood Glucose.: 208 mg/dL (24 Oct 2019 07:55)  POCT Blood Glucose.: 118 mg/dL (23 Oct 2019 20:48)  POCT Blood Glucose.: 335 mg/dL (23 Oct 2019 16:51)  POCT Blood Glucose.: 115 mg/dL (23 Oct 2019 12:17)              RADIOLOGY & ADDITIONAL TESTS:    Imaging Personally Reviewed:    Consultant(s) Notes Reviewed:      Care Discussed with Consultants/Other Providers:

## 2019-10-24 NOTE — PROGRESS NOTE ADULT - SUBJECTIVE AND OBJECTIVE BOX
CC/Reason for Consult: Diabetes Medication     SUBJECTIVE / OVERNIGHT EVENTS:    MEDICATIONS  (STANDING):  amLODIPine   Tablet 5 milliGRAM(s) Oral daily  atorvastatin 20 milliGRAM(s) Oral at bedtime  dextrose 50% Injectable 25 Gram(s) IV Push once  insulin glargine Injectable (LANTUS) 28 Unit(s) SubCutaneous at bedtime  insulin lispro (HumaLOG) corrective regimen sliding scale   SubCutaneous three times a day before meals  insulin lispro (HumaLOG) corrective regimen sliding scale   SubCutaneous at bedtime  insulin lispro Injectable (HumaLOG) 12 Unit(s) SubCutaneous three times a day before meals  montelukast 10 milliGRAM(s) Oral daily  petrolatum white Ointment 1 Application(s) Topical every 12 hours  triamcinolone 0.1% Cream 1 Application(s) Topical two times a day  venlafaxine XR 75 milliGRAM(s) Oral daily    MEDICATIONS  (PRN):  glucagon  Injectable 1 milliGRAM(s) IntraMuscular once PRN Glucose LESS THAN 70 milligrams/deciliter  LORazepam     Tablet 1 milliGRAM(s) Oral every 6 hours PRN Agitation  LORazepam   Injectable 1 milliGRAM(s) IntraMuscular every 6 hours PRN Agitation      Vital Signs Last 24 Hrs  T(C): 36.9 (24 Oct 2019 08:23), Max: 36.9 (24 Oct 2019 08:23)  T(F): 98.4 (24 Oct 2019 08:23), Max: 98.4 (24 Oct 2019 08:23)  HR: --  BP: --  BP(mean): --  RR: 17 (24 Oct 2019 08:23) (17 - 17)  SpO2: --  CAPILLARY BLOOD GLUCOSE      POCT Blood Glucose.: 208 mg/dL (24 Oct 2019 07:55)  POCT Blood Glucose.: 118 mg/dL (23 Oct 2019 20:48)  POCT Blood Glucose.: 335 mg/dL (23 Oct 2019 16:51)  POCT Blood Glucose.: 115 mg/dL (23 Oct 2019 12:17)        PHYSICAL EXAM:  GENERAL: NAD, well-developed  HEAD:  Atraumatic, Normocephalic  EYES: EOMI, conjunctiva and sclera clear  NECK: Supple, No JVD  CHEST/LUNG: Clear to auscultation bilaterally; No wheeze  HEART: Regular rate and rhythm; No murmurs, rubs, or gallops  ABDOMEN: Soft, Nontender, Nondistended; Bowel sounds present  EXTREMITIES:  2+ Peripheral Pulses, No clubbing, cyanosis, or edema  PSYCH: AAOx3  NEUROLOGY: non-focal  SKIN: No rashes or lesions    LABS:                    RADIOLOGY & ADDITIONAL TESTS:    Imaging Personally Reviewed:    Consultant(s) Notes Reviewed:      Care Discussed with Consultants/Other Providers:

## 2019-10-24 NOTE — PROGRESS NOTE ADULT - ASSESSMENT
63 yo woman with history of depression, poorly controlled Type 2 Diabetes - 15.9 - 10/17 , HTN, HLD, CKD stage 3, initially presented to [place] with elevated fingerstick. She was found to have FS in the 400s, no ketones,  admitted with [facility], found to have depression transferred to [facility] for further management. Medicine consulted for diabetic co-management.     #DM type 2 poorly controlled - A1c of 15.9; finger sticks - above goal will increase lantus   - Continue  Glargine to 28 units QHS  - Will increase pre-meal 12 units from 10 units in addition to correction dose sliding scale   - Will monitor fingersticks, goals (FS <180)  and adjust as needed   - Per collateral obtained form pharmacy, it appears that Dr. Andrews adjusted medication - lantus 50 units QHS and 10 premeal. However, due to insurance issues never filled.   - Diabetic diet   - Diabetic nutrition consult, appreciate recs   - Discharge dose - likely 28  units QHS and 10-12 units pre-meal - Pt. will need new prescription's      #HTN - at goal 110/59   -  holding lisinopril   - continue amlodipine 5mg; will uptitrate as tolerated     #HLD   - Continue atorvastatin    #CKD stage 3 - unclear baseline (appears to be around 1.9) - most likely secondary to diabetic nephropathy -  -Monitor BMP   - In the ED, patient received 1L IVF  - Holding lisinopril   - Stable - 1.81 - on 10/18   #Depression   - Per psychiatry

## 2019-10-24 NOTE — PROGRESS NOTE ADULT - ASSESSMENT
63 yo woman with history of depression, poorly controlled Type 2 Diabetes - 15.9 - 10/17 , HTN, HLD, CKD stage 3, initially presented to [place] with elevated fingerstick. She was found to have FS in the 400s, no ketones,  admitted with [facility], found to have depression transferred to [facility] for further management. Medicine consulted for diabetic co-management.     #DM type 2 poorly controlled - A1c of 15.9; finger sticks - above goal will increase lantus   - Increase  Glargine to 30 units from  28 units QHS  - Continue iwth  pre-meal 12 units from 10 units in addition to correction dose sliding scale   - Will monitor fingersticks, goals (FS <180)  and adjust as needed   - Per collateral obtained form pharmacy, it appears that Dr. Andrews adjusted medication - lantus 50 units QHS and 10 premeal. However, due to insurance issues never filled.   - Diabetic diet   - Diabetic nutrition consult, appreciate recs   - Discharge dose - likely 28  units QHS and 10-12 units pre-meal - Pt. will need new prescription's      #HTN - at goal 110/59   -  holding lisinopril   - continue amlodipine 5mg; will uptitrate as tolerated     #HLD   - Continue atorvastatin    #CKD stage 3 - unclear baseline (appears to be around 1.9) - most likely secondary to diabetic nephropathy -  -Monitor BMP   - In the ED, patient received 1L IVF  - Holding lisinopril   - Stable - 1.81 - on 10/18   #Depression   - Per psychiatry 65 yo woman with history of depression, poorly controlled Type 2 Diabetes - 15.9 - 10/17 , HTN, HLD, CKD stage 3, initially presented to [place] with elevated fingerstick. She was found to have FS in the 400s, no ketones,  admitted with [facility], found to have depression transferred to [facility] for further management. Medicine consulted for diabetic co-management.     #DM type 2 poorly controlled - A1c of 15.9; finger sticks - above goal will increase lantus   - Increase  Glargine to 30 units from  28 units QHS  - Continue iwth  pre-meal 12 units from 10 units in addition to correction dose sliding scale   - Will monitor fingersticks, goals (FS <180)  and adjust as needed   - Per collateral obtained form pharmacy, it appears that Dr. Andrews adjusted medication - lantus 50 units QHS and 10 premeal. However, due to insurance issues never filled.   - Diabetic diet   - Diabetic nutrition consult, appreciate recs   - Discharge dose - likely 28  units QHS and 10-12 units pre-meal - Pt. will need new prescription's      #HTN - at goal 110/59   -  Re- start  lisinopril; given proteinuria   - continue amlodipine 5mg; will uptitrate as tolerated     #HLD   - Continue atorvastatin    #CKD stage 3 - unclear baseline (appears to be around 1.9) - most likely secondary to diabetic nephropathy -  -Monitor BMP   - In the ED, patient received 1L IVF  - re- start lisinopril 5mg given proteinuria   - Stable - 1.81 - on 10/18   #Depression   - Per psychiatry 63 yo woman with history of depression, poorly controlled Type 2 Diabetes - 15.9 - 10/17 , HTN, HLD, CKD stage 3, initially presented to [place] with elevated fingerstick. She was found to have FS in the 400s, no ketones,  admitted with [facility], found to have depression transferred to [facility] for further management. Medicine consulted for diabetic co-management.     #DM type 2 poorly controlled - A1c of 15.9; finger sticks - above goal will increase lantus   - Increase  Glargine to 30 units from  28 units QHS  - Continue iwth  pre-meal 12 units from 10 units in addition to correction dose sliding scale   - Will monitor fingersticks, goals (FS <180)  and adjust as needed   - Per collateral obtained form pharmacy, it appears that Dr. Andrews adjusted medication - lantus 50 units QHS and 10 premeal. However, due to insurance issues never filled.   - Diabetic diet   - Diabetic nutrition consult, appreciate recs   - Discharge dose - likely 28  units QHS and 10-12 units pre-meal - Pt. will need new prescription's      #HTN - at goal 110/59   -  Re- start  lisinopril; given proteinuria   - discontinue amlodipine 5mg; given borderline blood pressure and re-starting lisinopril     #HLD   - Continue atorvastatin    #CKD stage 3 - unclear baseline (appears to be around 1.9) - most likely secondary to diabetic nephropathy -  -Monitor BMP   - In the ED, patient received 1L IVF  - re- start lisinopril 5mg given proteinuria   - Stable - 1.81 - on 10/18   #Depression   - Per psychiatry

## 2019-10-25 LAB
ANION GAP SERPL CALC-SCNC: 15 MMO/L — HIGH (ref 7–14)
B-OH-BUTYR SERPL-SCNC: 0.1 MMOL/L — SIGNIFICANT CHANGE UP (ref 0–0.4)
BUN SERPL-MCNC: 79 MG/DL — HIGH (ref 7–23)
CALCIUM SERPL-MCNC: 8.3 MG/DL — LOW (ref 8.4–10.5)
CHLORIDE SERPL-SCNC: 93 MMOL/L — LOW (ref 98–107)
CO2 SERPL-SCNC: 19 MMOL/L — LOW (ref 22–31)
CREAT SERPL-MCNC: 2.24 MG/DL — HIGH (ref 0.5–1.3)
GLUCOSE SERPL-MCNC: 491 MG/DL — CRITICAL HIGH (ref 70–99)
POTASSIUM SERPL-MCNC: 4.9 MMOL/L — SIGNIFICANT CHANGE UP (ref 3.5–5.3)
POTASSIUM SERPL-SCNC: 4.9 MMOL/L — SIGNIFICANT CHANGE UP (ref 3.5–5.3)
SODIUM SERPL-SCNC: 127 MMOL/L — LOW (ref 135–145)

## 2019-10-25 PROCEDURE — 99231 SBSQ HOSP IP/OBS SF/LOW 25: CPT

## 2019-10-25 PROCEDURE — 99232 SBSQ HOSP IP/OBS MODERATE 35: CPT

## 2019-10-25 RX ORDER — INSULIN GLARGINE 100 [IU]/ML
29 INJECTION, SOLUTION SUBCUTANEOUS AT BEDTIME
Refills: 0 | Status: DISCONTINUED | OUTPATIENT
Start: 2019-10-25 | End: 2019-11-01

## 2019-10-25 RX ORDER — INSULIN LISPRO 100/ML
10 VIAL (ML) SUBCUTANEOUS
Refills: 0 | Status: DISCONTINUED | OUTPATIENT
Start: 2019-10-25 | End: 2019-11-03

## 2019-10-25 RX ADMIN — Medication 112.5 MILLIGRAM(S): at 09:02

## 2019-10-25 RX ADMIN — ATORVASTATIN CALCIUM 20 MILLIGRAM(S): 80 TABLET, FILM COATED ORAL at 20:58

## 2019-10-25 RX ADMIN — Medication 1 APPLICATION(S): at 21:35

## 2019-10-25 RX ADMIN — Medication 10 UNIT(S): at 18:02

## 2019-10-25 RX ADMIN — Medication 1 APPLICATION(S): at 09:02

## 2019-10-25 RX ADMIN — Medication 2: at 21:35

## 2019-10-25 RX ADMIN — LISINOPRIL 2.5 MILLIGRAM(S): 2.5 TABLET ORAL at 09:02

## 2019-10-25 RX ADMIN — Medication 12: at 16:50

## 2019-10-25 RX ADMIN — MONTELUKAST 10 MILLIGRAM(S): 4 TABLET, CHEWABLE ORAL at 09:02

## 2019-10-25 RX ADMIN — INSULIN GLARGINE 29 UNIT(S): 100 INJECTION, SOLUTION SUBCUTANEOUS at 21:34

## 2019-10-25 NOTE — PROGRESS NOTE ADULT - ASSESSMENT
65 yo woman with history of depression, poorly controlled Type 2 Diabetes - 15.9 - 10/17 , HTN, HLD, CKD stage 3, initially presented to [place] with elevated fingerstick. She was found to have FS in the 400s, no ketones,  admitted with [facility], found to have depression transferred to [facility] for further management. Medicine consulted for diabetic co-management.     #DM type 2 poorly controlled - A1c of 15.9; finger sticks - above goal will increase lantus   - Decrease   Glargine  30 units from  28 units QHS  - Continue iwth  pre-meal 12 units from 10 units in addition to correction dose sliding scale   - Will monitor fingersticks, goals (FS <180)  and adjust as needed   - Per collateral obtained form pharmacy, it appears that Dr. Andrews adjusted medication - lantus 50 units QHS and 10 premeal. However, due to insurance issues never filled.   - Diabetic diet   - Diabetic nutrition consult, appreciate recs   - Discharge dose - likely 28  units QHS and 10-12 units pre-meal - Pt. will need new prescription's      #HTN - at goal 110/59   -  Re- start  lisinopril; given proteinuria   - discontinue amlodipine 5mg; given borderline blood pressure and re-starting lisinopril     #HLD   - Continue atorvastatin    #CKD stage 3 - unclear baseline (appears to be around 1.9) - most likely secondary to diabetic nephropathy -  -Monitor BMP   - In the ED, patient received 1L IVF  - re- start lisinopril 5mg given proteinuria   - Stable - 1.81 - on 10/18   #Depression   - Per psychiatry 63 yo woman with history of depression, poorly controlled Type 2 Diabetes - 15.9 - 10/17 , HTN, HLD, CKD stage 3, initially presented to [place] with elevated fingerstick. She was found to have FS in the 400s, no ketones,  admitted with [facility], found to have depression transferred to [facility] for further management. Medicine consulted for diabetic co-management.     #DM type 2 poorly controlled - A1c of 15.9; finger sticks - above goal will increase lantus   - Decrease   Glargine 29 from  30 units s QHS  - Decrease  pre-meal 10 units from 12 units in addition to correction dose sliding scale   - Will monitor fingersticks, goals (FS <180)  and adjust as needed   - Per collateral obtained form pharmacy, it appears that Dr. Andrews adjusted medication - lantus 50 units QHS and 10 premeal. However, due to insurance issues never filled.   - Diabetic diet   - Diabetic nutrition consult, appreciate recs   - Discharge dose - likely 28  units QHS and 10-12 units pre-meal - Pt. will need new prescription's      #HTN - at goal 110/59   -  Re- start  lisinopril; given proteinuria   - discontinue amlodipine 5mg; given borderline blood pressure and re-starting lisinopril     #HLD   - Continue atorvastatin    #CKD stage 3 - unclear baseline (appears to be around 1.9) - most likely secondary to diabetic nephropathy -  -Monitor BMP   - In the ED, patient received 1L IVF  - re- start lisinopril 5mg given proteinuria   - Stable - 1.81 - on 10/18   #Depression   - Per psychiatry 63 yo woman with history of depression, poorly controlled Type 2 Diabetes - 15.9 - 10/17 , HTN, HLD, CKD stage 3, initially presented to [place] with elevated fingerstick. She was found to have FS in the 400s, no ketones,  admitted with [facility], found to have depression transferred to [facility] for further management. Medicine consulted for diabetic co-management.     #DM type 2 poorly controlled - A1c of 15.9; finger sticks - above goal will increase lantus   - Decrease   Glargine 29 from  30 units s QHS  - Decrease  pre-meal 10 units from 12 units in addition to correction dose sliding scale   - Will monitor fingersticks, goals (FS <180)  and adjust as needed   - Per collateral obtained form pharmacy, it appears that Dr. Andrews adjusted medication - lantus 50 units QHS and 10 premeal. However, due to insurance issues never filled.   - Diabetic diet   - Diabetic nutrition consult, appreciate recs   - Discharge dose - likely 28  units QHS and 10-12 units pre-meal - Pt. will need new prescription's      #HTN - at goal 121/78  -  C/w   lisinopril; given proteinuria   - discontinue amlodipine 5mg; given borderline blood pressure and re-starting lisinopril     #HLD   - Continue atorvastatin    #CKD stage 3 - unclear baseline (appears to be around 1.9) - most likely secondary to diabetic nephropathy -  -Monitor BMP   - In the ED, patient received 1L IVF  - re- start lisinopril 5mg given proteinuria   - Stable - 1.81 - on 10/18   #Depression   - Per psychiatry

## 2019-10-25 NOTE — PROGRESS NOTE ADULT - SUBJECTIVE AND OBJECTIVE BOX
CC/Reason for Consult:     SUBJECTIVE / OVERNIGHT EVENTS:    MEDICATIONS  (STANDING):  atorvastatin 20 milliGRAM(s) Oral at bedtime  dextrose 50% Injectable 25 Gram(s) IV Push once  insulin glargine Injectable (LANTUS) 30 Unit(s) SubCutaneous at bedtime  insulin lispro (HumaLOG) corrective regimen sliding scale   SubCutaneous three times a day before meals  insulin lispro (HumaLOG) corrective regimen sliding scale   SubCutaneous at bedtime  insulin lispro Injectable (HumaLOG) 12 Unit(s) SubCutaneous three times a day before meals  lisinopril 2.5 milliGRAM(s) Oral daily  montelukast 10 milliGRAM(s) Oral daily  petrolatum white Ointment 1 Application(s) Topical every 12 hours  triamcinolone 0.1% Cream 1 Application(s) Topical two times a day  venlafaxine .5 milliGRAM(s) Oral daily    MEDICATIONS  (PRN):  glucagon  Injectable 1 milliGRAM(s) IntraMuscular once PRN Glucose LESS THAN 70 milligrams/deciliter  LORazepam     Tablet 1 milliGRAM(s) Oral every 6 hours PRN Agitation  LORazepam   Injectable 1 milliGRAM(s) IntraMuscular every 6 hours PRN Agitation      Vital Signs Last 24 Hrs  T(C): 36.6 (25 Oct 2019 08:06), Max: 36.9 (24 Oct 2019 20:58)  T(F): 97.8 (25 Oct 2019 08:06), Max: 98.4 (24 Oct 2019 20:58)  HR: 93 (24 Oct 2019 20:58) (93 - 93)  BP: 121/78 (24 Oct 2019 20:58) (121/78 - 121/78)  BP(mean): --  RR: 18 (25 Oct 2019 08:06) (18 - 18)  SpO2: --  CAPILLARY BLOOD GLUCOSE      POCT Blood Glucose.: 85 mg/dL (25 Oct 2019 08:04)  POCT Blood Glucose.: 164 mg/dL (24 Oct 2019 20:37)  POCT Blood Glucose.: 86 mg/dL (24 Oct 2019 17:07)  POCT Blood Glucose.: 137 mg/dL (24 Oct 2019 12:41)        PHYSICAL EXAM:  GENERAL: NAD, well-developed  HEAD:  Atraumatic, Normocephalic  EYES: EOMI, conjunctiva and sclera clear  NECK: Supple, No JVD  CHEST/LUNG: Clear to auscultation bilaterally; No wheeze  HEART: Regular rate and rhythm; No murmurs, rubs, or gallops  ABDOMEN: Soft, Nontender, Nondistended; Bowel sounds present  EXTREMITIES:  2+ Peripheral Pulses, No clubbing, cyanosis, or edema  PSYCH: AAOx3  NEUROLOGY: non-focal  SKIN: No rashes or lesions    LABS:                    RADIOLOGY & ADDITIONAL TESTS:    Imaging Personally Reviewed:    Consultant(s) Notes Reviewed:      Care Discussed with Consultants/Other Providers: CC/Reason for Consult: Diabetes management     SUBJECTIVE / OVERNIGHT EVENTS: Patient's blood sugar tighly controlled yesterday. Her pre-dinner blood sugar low - and did not receive pre-dinner insulin. Casey received a total of 58 units of insulin yesterday - 30 long acting and 38 short acting. This am, patient requesing to go home. Denies any medical complaints.     MEDICATIONS  (STANDING):  atorvastatin 20 milliGRAM(s) Oral at bedtime  dextrose 50% Injectable 25 Gram(s) IV Push once  insulin glargine Injectable (LANTUS) 30 Unit(s) SubCutaneous at bedtime  insulin lispro (HumaLOG) corrective regimen sliding scale   SubCutaneous three times a day before meals  insulin lispro (HumaLOG) corrective regimen sliding scale   SubCutaneous at bedtime  insulin lispro Injectable (HumaLOG) 12 Unit(s) SubCutaneous three times a day before meals  lisinopril 2.5 milliGRAM(s) Oral daily  montelukast 10 milliGRAM(s) Oral daily  petrolatum white Ointment 1 Application(s) Topical every 12 hours  triamcinolone 0.1% Cream 1 Application(s) Topical two times a day  venlafaxine .5 milliGRAM(s) Oral daily    MEDICATIONS  (PRN):  glucagon  Injectable 1 milliGRAM(s) IntraMuscular once PRN Glucose LESS THAN 70 milligrams/deciliter  LORazepam     Tablet 1 milliGRAM(s) Oral every 6 hours PRN Agitation  LORazepam   Injectable 1 milliGRAM(s) IntraMuscular every 6 hours PRN Agitation      Vital Signs Last 24 Hrs  T(C): 36.6 (25 Oct 2019 08:06), Max: 36.9 (24 Oct 2019 20:58)  T(F): 97.8 (25 Oct 2019 08:06), Max: 98.4 (24 Oct 2019 20:58)  HR: 93 (24 Oct 2019 20:58) (93 - 93)  BP: 121/78 (24 Oct 2019 20:58) (121/78 - 121/78)  BP(mean): --  RR: 18 (25 Oct 2019 08:06) (18 - 18)  SpO2: --  CAPILLARY BLOOD GLUCOSE      POCT Blood Glucose.: 85 mg/dL (25 Oct 2019 08:04)  POCT Blood Glucose.: 164 mg/dL (24 Oct 2019 20:37)  POCT Blood Glucose.: 86 mg/dL (24 Oct 2019 17:07)  POCT Blood Glucose.: 137 mg/dL (24 Oct 2019 12:41)        PHYSICAL EXAM:  GENERAL: Elderly woman in NAD, well-developed  HEAD:  Atraumatic, Normocephalic  EYES: EOMI, conjunctiva and sclera clear  NECK: Supple, No JVD  CHEST/LUNG: Clear to auscultation bilaterally; No wheeze  HEART: Regular rate and rhythm; No murmurs, rubs, or gallops  ABDOMEN: Soft, Nontender, Nondistended; Bowel sounds present  EXTREMITIES:  2+ Peripheral Pulses, No clubbing, cyanosis, or edema  PSYCH: AAOx3  NEUROLOGY: non-focal  SKIN: No rashes or lesions    LABS:                    RADIOLOGY & ADDITIONAL TESTS:    Imaging Personally Reviewed:    Consultant(s) Notes Reviewed:      Care Discussed with Consultants/Other Providers:

## 2019-10-26 LAB
ANION GAP SERPL CALC-SCNC: 16 MMO/L — HIGH (ref 7–14)
BUN SERPL-MCNC: 76 MG/DL — HIGH (ref 7–23)
CALCIUM SERPL-MCNC: 9.4 MG/DL — SIGNIFICANT CHANGE UP (ref 8.4–10.5)
CHLORIDE SERPL-SCNC: 103 MMOL/L — SIGNIFICANT CHANGE UP (ref 98–107)
CO2 SERPL-SCNC: 19 MMOL/L — LOW (ref 22–31)
CREAT SERPL-MCNC: 2.25 MG/DL — HIGH (ref 0.5–1.3)
GLUCOSE SERPL-MCNC: 77 MG/DL — SIGNIFICANT CHANGE UP (ref 70–99)
POTASSIUM SERPL-MCNC: 4.7 MMOL/L — SIGNIFICANT CHANGE UP (ref 3.5–5.3)
POTASSIUM SERPL-SCNC: 4.7 MMOL/L — SIGNIFICANT CHANGE UP (ref 3.5–5.3)
SODIUM SERPL-SCNC: 138 MMOL/L — SIGNIFICANT CHANGE UP (ref 135–145)

## 2019-10-26 PROCEDURE — 99233 SBSQ HOSP IP/OBS HIGH 50: CPT

## 2019-10-26 RX ADMIN — Medication 10 UNIT(S): at 08:47

## 2019-10-26 RX ADMIN — MONTELUKAST 10 MILLIGRAM(S): 4 TABLET, CHEWABLE ORAL at 09:04

## 2019-10-26 RX ADMIN — Medication 10 UNIT(S): at 12:48

## 2019-10-26 RX ADMIN — Medication 112.5 MILLIGRAM(S): at 09:04

## 2019-10-26 RX ADMIN — ATORVASTATIN CALCIUM 20 MILLIGRAM(S): 80 TABLET, FILM COATED ORAL at 20:54

## 2019-10-26 RX ADMIN — INSULIN GLARGINE 29 UNIT(S): 100 INJECTION, SOLUTION SUBCUTANEOUS at 21:21

## 2019-10-26 RX ADMIN — Medication 10 UNIT(S): at 17:33

## 2019-10-26 RX ADMIN — Medication 1 APPLICATION(S): at 21:22

## 2019-10-26 RX ADMIN — LISINOPRIL 2.5 MILLIGRAM(S): 2.5 TABLET ORAL at 09:04

## 2019-10-26 RX ADMIN — Medication 1 APPLICATION(S): at 21:21

## 2019-10-26 NOTE — PROGRESS NOTE ADULT - SUBJECTIVE AND OBJECTIVE BOX
CC/Reason for Consult:     SUBJECTIVE / OVERNIGHT EVENTS:    MEDICATIONS  (STANDING):  atorvastatin 20 milliGRAM(s) Oral at bedtime  dextrose 50% Injectable 25 Gram(s) IV Push once  insulin glargine Injectable (LANTUS) 29 Unit(s) SubCutaneous at bedtime  insulin lispro (HumaLOG) corrective regimen sliding scale   SubCutaneous three times a day before meals  insulin lispro (HumaLOG) corrective regimen sliding scale   SubCutaneous at bedtime  insulin lispro Injectable (HumaLOG) 10 Unit(s) SubCutaneous three times a day before meals  lisinopril 2.5 milliGRAM(s) Oral daily  montelukast 10 milliGRAM(s) Oral daily  petrolatum white Ointment 1 Application(s) Topical every 12 hours  triamcinolone 0.1% Cream 1 Application(s) Topical two times a day  venlafaxine .5 milliGRAM(s) Oral daily    MEDICATIONS  (PRN):  glucagon  Injectable 1 milliGRAM(s) IntraMuscular once PRN Glucose LESS THAN 70 milligrams/deciliter  LORazepam     Tablet 1 milliGRAM(s) Oral every 6 hours PRN Agitation  LORazepam   Injectable 1 milliGRAM(s) IntraMuscular every 6 hours PRN Agitation      Vital Signs Last 24 Hrs  T(C): 36.5 (26 Oct 2019 08:42), Max: 36.5 (26 Oct 2019 08:42)  T(F): 97.7 (26 Oct 2019 08:42), Max: 97.7 (26 Oct 2019 08:42)  HR: --  BP: 118/78 (26 Oct 2019 08:54) (118/78 - 118/78)  BP(mean): --  RR: 18 (26 Oct 2019 08:42) (18 - 18)  SpO2: --  CAPILLARY BLOOD GLUCOSE      POCT Blood Glucose.: 77 mg/dL (26 Oct 2019 07:49)  POCT Blood Glucose.: 280 mg/dL (25 Oct 2019 21:18)  POCT Blood Glucose.: 350 mg/dL (25 Oct 2019 19:20)  POCT Blood Glucose.: 474 mg/dL (25 Oct 2019 18:01)  POCT Blood Glucose.: 536 mg/dL (25 Oct 2019 17:25)  POCT Blood Glucose.: 543 mg/dL (25 Oct 2019 16:39)  POCT Blood Glucose.: 525 mg/dL (25 Oct 2019 16:28)        PHYSICAL EXAM:  GENERAL: NAD, well-developed  HEAD:  Atraumatic, Normocephalic  EYES: EOMI, conjunctiva and sclera clear  NECK: Supple, No JVD  CHEST/LUNG: Clear to auscultation bilaterally; No wheeze  HEART: Regular rate and rhythm; No murmurs, rubs, or gallops  ABDOMEN: Soft, Nontender, Nondistended; Bowel sounds present  EXTREMITIES:  2+ Peripheral Pulses, No clubbing, cyanosis, or edema  PSYCH: AAOx3  NEUROLOGY: non-focal  SKIN: No rashes or lesions    LABS:    10-26    138  |  103  |  76<H>  ----------------------------<  77  4.7   |  19<L>  |  2.25<H>    Ca    9.4      26 Oct 2019 08:39                RADIOLOGY & ADDITIONAL TESTS:    Imaging Personally Reviewed:    Consultant(s) Notes Reviewed:      Care Discussed with Consultants/Other Providers: CC: depression, uncontrolled FS    SUBJECTIVE / OVERNIGHT EVENTS: FS elevated yesterday in context of putting a lot of extra sugers in her drinks. FS better controlled today. Denies F/C, CP, SOB, abn pain, N/V/D/C, change in urinary freq. tolerating diet.      MEDICATIONS  (STANDING):  atorvastatin 20 milliGRAM(s) Oral at bedtime  dextrose 50% Injectable 25 Gram(s) IV Push once  insulin glargine Injectable (LANTUS) 29 Unit(s) SubCutaneous at bedtime  insulin lispro (HumaLOG) corrective regimen sliding scale   SubCutaneous three times a day before meals  insulin lispro (HumaLOG) corrective regimen sliding scale   SubCutaneous at bedtime  insulin lispro Injectable (HumaLOG) 10 Unit(s) SubCutaneous three times a day before meals  lisinopril 2.5 milliGRAM(s) Oral daily  montelukast 10 milliGRAM(s) Oral daily  petrolatum white Ointment 1 Application(s) Topical every 12 hours  triamcinolone 0.1% Cream 1 Application(s) Topical two times a day  venlafaxine .5 milliGRAM(s) Oral daily    MEDICATIONS  (PRN):  glucagon  Injectable 1 milliGRAM(s) IntraMuscular once PRN Glucose LESS THAN 70 milligrams/deciliter  LORazepam     Tablet 1 milliGRAM(s) Oral every 6 hours PRN Agitation  LORazepam   Injectable 1 milliGRAM(s) IntraMuscular every 6 hours PRN Agitation      Vital Signs Last 24 Hrs  T(C): 36.5 (26 Oct 2019 08:42), Max: 36.5 (26 Oct 2019 08:42)  T(F): 97.7 (26 Oct 2019 08:42), Max: 97.7 (26 Oct 2019 08:42)  HR: --  BP: 118/78 (26 Oct 2019 08:54) (118/78 - 118/78)  BP(mean): --  RR: 18 (26 Oct 2019 08:42) (18 - 18)  SpO2: --  CAPILLARY BLOOD GLUCOSE      POCT Blood Glucose.: 77 mg/dL (26 Oct 2019 07:49)  POCT Blood Glucose.: 280 mg/dL (25 Oct 2019 21:18)  POCT Blood Glucose.: 350 mg/dL (25 Oct 2019 19:20)  POCT Blood Glucose.: 474 mg/dL (25 Oct 2019 18:01)  POCT Blood Glucose.: 536 mg/dL (25 Oct 2019 17:25)  POCT Blood Glucose.: 543 mg/dL (25 Oct 2019 16:39)  POCT Blood Glucose.: 525 mg/dL (25 Oct 2019 16:28)        PHYSICAL EXAM:  GENERAL: NAD, well-developed  HEAD:  Atraumatic, Normocephalic  EYES: EOMI, conjunctiva and sclera clear  NECK:  No JVD  CHEST/LUNG: Clear to auscultation bilaterally; No wheeze  HEART: Regular rate and rhythm; No murmurs, rubs, or gallops  ABDOMEN: Soft, Nontender, Nondistended;   EXTREMITIES:  No clubbing, cyanosis, or edema  PSYCH: AAOx3  NEUROLOGY: non-focal  SKIN: No rashes or lesions    LABS:    10-26    138  |  103  |  76<H>  ----------------------------<  77  4.7   |  19<L>  |  2.25<H>    Ca    9.4      26 Oct 2019 08:39                RADIOLOGY & ADDITIONAL TESTS:    Imaging Personally Reviewed:    Consultant(s) Notes Reviewed:  behavioral health    Care Discussed with Consultants/Other Providers: discussed FS management with ANNIE

## 2019-10-26 NOTE — PROGRESS NOTE ADULT - ASSESSMENT
65 yo woman with history of depression, poorly controlled Type 2 Diabetes - 15.9 - 10/17 , HTN, HLD, CKD stage 3, initially presented to [place] with elevated fingerstick. She was found to have FS in the 400s, no ketones,  admitted with [facility], found to have depression transferred to [facility] for further management. Medicine consulted for diabetic co-management.     #DM type 2 poorly controlled - A1c of 15.9; finger sticks - above goal will increase lantus   - Decrease   Glargine 29 from  30 units s QHS  - Decrease  pre-meal 10 units from 12 units in addition to correction dose sliding scale   - Will monitor fingersticks, goals (FS <180)  and adjust as needed   - Per collateral obtained form pharmacy, it appears that Dr. Andrews adjusted medication - lantus 50 units QHS and 10 premeal. However, due to insurance issues never filled.   - Diabetic diet   - Diabetic nutrition consult, appreciate recs   - Discharge dose - likely 28  units QHS and 10-12 units pre-meal - Pt. will need new prescription's      #HTN - at goal 121/78  -  C/w   lisinopril; given proteinuria   - discontinue amlodipine 5mg; given borderline blood pressure and re-starting lisinopril     #HLD   - Continue atorvastatin    #CKD stage 3 - unclear baseline (appears to be around 1.9) - most likely secondary to diabetic nephropathy -  -Monitor BMP   - In the ED, patient received 1L IVF  - re- start lisinopril 5mg given proteinuria   - Stable - 1.81 - on 10/18   #Depression   - Per psychiatry 65 yo woman with history of depression, poorly controlled Type 2 Diabetes - 15.9 - 10/17 , HTN, HLD, CKD stage 3, initially presented to [place] with elevated fingerstick. She was found to have FS in the 400s, no ketones,  admitted with [facility], found to have depression transferred to [facility] for further management. Medicine consulted for diabetic co-management.     #DM type 2 poorly controlled - A1c of 15.9; finger sticks better controlled today  - instructed patient to not put sugar packs in coffee or drinks   - c/w current basal/bolus regimen  - FS qac and hs with SSI  - Diabetic diet   - Diabetic nutrition consult, appreciate recs   - Discharge dose - likely 28  units QHS and 10-12 units pre-meal - Pt. will need new prescription's      #HTN - at goal   -  C/w lisinopril     #HLD   - Continue atorvastatin    #CKD stage 3 - unclear baseline (appears to be around 1.9) - most likely secondary to diabetic nephropathy -  -Monitor Cr and renally dose meds  - re- started lisinopril 5mg given proteinuria     #Depression   - Per psychiatry

## 2019-10-27 RX ADMIN — INSULIN GLARGINE 29 UNIT(S): 100 INJECTION, SOLUTION SUBCUTANEOUS at 21:00

## 2019-10-27 RX ADMIN — Medication 1 APPLICATION(S): at 08:31

## 2019-10-27 RX ADMIN — LISINOPRIL 2.5 MILLIGRAM(S): 2.5 TABLET ORAL at 08:30

## 2019-10-27 RX ADMIN — Medication 10 UNIT(S): at 08:31

## 2019-10-27 RX ADMIN — ATORVASTATIN CALCIUM 20 MILLIGRAM(S): 80 TABLET, FILM COATED ORAL at 20:15

## 2019-10-27 RX ADMIN — Medication 1 APPLICATION(S): at 08:30

## 2019-10-27 RX ADMIN — Medication 10 UNIT(S): at 17:39

## 2019-10-27 RX ADMIN — Medication 112.5 MILLIGRAM(S): at 08:31

## 2019-10-27 RX ADMIN — Medication 10 UNIT(S): at 12:29

## 2019-10-27 RX ADMIN — MONTELUKAST 10 MILLIGRAM(S): 4 TABLET, CHEWABLE ORAL at 08:30

## 2019-10-28 PROCEDURE — 99232 SBSQ HOSP IP/OBS MODERATE 35: CPT

## 2019-10-28 RX ORDER — VENLAFAXINE HCL 75 MG
150 CAPSULE, EXT RELEASE 24 HR ORAL DAILY
Refills: 0 | Status: DISCONTINUED | OUTPATIENT
Start: 2019-10-28 | End: 2019-11-08

## 2019-10-28 RX ADMIN — Medication 1 APPLICATION(S): at 21:59

## 2019-10-28 RX ADMIN — Medication 112.5 MILLIGRAM(S): at 08:23

## 2019-10-28 RX ADMIN — LISINOPRIL 2.5 MILLIGRAM(S): 2.5 TABLET ORAL at 08:23

## 2019-10-28 RX ADMIN — INSULIN GLARGINE 29 UNIT(S): 100 INJECTION, SOLUTION SUBCUTANEOUS at 21:35

## 2019-10-28 RX ADMIN — Medication 10 UNIT(S): at 12:48

## 2019-10-28 RX ADMIN — MONTELUKAST 10 MILLIGRAM(S): 4 TABLET, CHEWABLE ORAL at 08:23

## 2019-10-28 RX ADMIN — Medication 0: at 20:12

## 2019-10-28 RX ADMIN — Medication 1 APPLICATION(S): at 08:23

## 2019-10-28 RX ADMIN — Medication 10 UNIT(S): at 08:19

## 2019-10-28 RX ADMIN — ATORVASTATIN CALCIUM 20 MILLIGRAM(S): 80 TABLET, FILM COATED ORAL at 21:35

## 2019-10-28 RX ADMIN — Medication 2: at 17:00

## 2019-10-28 RX ADMIN — Medication 10 UNIT(S): at 17:00

## 2019-10-29 LAB
ALBUMIN SERPL ELPH-MCNC: 3.6 G/DL — SIGNIFICANT CHANGE UP (ref 3.3–5)
ALP SERPL-CCNC: 79 U/L — SIGNIFICANT CHANGE UP (ref 40–120)
ALT FLD-CCNC: 14 U/L — SIGNIFICANT CHANGE UP (ref 4–33)
ANION GAP SERPL CALC-SCNC: 15 MMO/L — HIGH (ref 7–14)
AST SERPL-CCNC: 18 U/L — SIGNIFICANT CHANGE UP (ref 4–32)
BASOPHILS # BLD AUTO: 0.12 K/UL — SIGNIFICANT CHANGE UP (ref 0–0.2)
BASOPHILS NFR BLD AUTO: 1.1 % — SIGNIFICANT CHANGE UP (ref 0–2)
BILIRUB SERPL-MCNC: < 0.2 MG/DL — LOW (ref 0.2–1.2)
BUN SERPL-MCNC: 75 MG/DL — HIGH (ref 7–23)
CALCIUM SERPL-MCNC: 9.6 MG/DL — SIGNIFICANT CHANGE UP (ref 8.4–10.5)
CHLORIDE SERPL-SCNC: 103 MMOL/L — SIGNIFICANT CHANGE UP (ref 98–107)
CO2 SERPL-SCNC: 22 MMOL/L — SIGNIFICANT CHANGE UP (ref 22–31)
CREAT SERPL-MCNC: 2.36 MG/DL — HIGH (ref 0.5–1.3)
EOSINOPHIL # BLD AUTO: 0.47 K/UL — SIGNIFICANT CHANGE UP (ref 0–0.5)
EOSINOPHIL NFR BLD AUTO: 4.2 % — SIGNIFICANT CHANGE UP (ref 0–6)
GLUCOSE SERPL-MCNC: 75 MG/DL — SIGNIFICANT CHANGE UP (ref 70–99)
HCT VFR BLD CALC: 34.7 % — SIGNIFICANT CHANGE UP (ref 34.5–45)
HGB BLD-MCNC: 10.9 G/DL — LOW (ref 11.5–15.5)
IMM GRANULOCYTES NFR BLD AUTO: 0.3 % — SIGNIFICANT CHANGE UP (ref 0–1.5)
LYMPHOCYTES # BLD AUTO: 3.68 K/UL — HIGH (ref 1–3.3)
LYMPHOCYTES # BLD AUTO: 33 % — SIGNIFICANT CHANGE UP (ref 13–44)
MCHC RBC-ENTMCNC: 29.9 PG — SIGNIFICANT CHANGE UP (ref 27–34)
MCHC RBC-ENTMCNC: 31.4 % — LOW (ref 32–36)
MCV RBC AUTO: 95.3 FL — SIGNIFICANT CHANGE UP (ref 80–100)
MONOCYTES # BLD AUTO: 0.62 K/UL — SIGNIFICANT CHANGE UP (ref 0–0.9)
MONOCYTES NFR BLD AUTO: 5.6 % — SIGNIFICANT CHANGE UP (ref 2–14)
NEUTROPHILS # BLD AUTO: 6.24 K/UL — SIGNIFICANT CHANGE UP (ref 1.8–7.4)
NEUTROPHILS NFR BLD AUTO: 55.8 % — SIGNIFICANT CHANGE UP (ref 43–77)
NRBC # FLD: 0 K/UL — SIGNIFICANT CHANGE UP (ref 0–0)
PLATELET # BLD AUTO: 456 K/UL — HIGH (ref 150–400)
PMV BLD: 9 FL — SIGNIFICANT CHANGE UP (ref 7–13)
POTASSIUM SERPL-MCNC: 4.7 MMOL/L — SIGNIFICANT CHANGE UP (ref 3.5–5.3)
POTASSIUM SERPL-SCNC: 4.7 MMOL/L — SIGNIFICANT CHANGE UP (ref 3.5–5.3)
PROT SERPL-MCNC: 8.3 G/DL — SIGNIFICANT CHANGE UP (ref 6–8.3)
RBC # BLD: 3.64 M/UL — LOW (ref 3.8–5.2)
RBC # FLD: 14 % — SIGNIFICANT CHANGE UP (ref 10.3–14.5)
SODIUM SERPL-SCNC: 140 MMOL/L — SIGNIFICANT CHANGE UP (ref 135–145)
WBC # BLD: 11.16 K/UL — HIGH (ref 3.8–10.5)
WBC # FLD AUTO: 11.16 K/UL — HIGH (ref 3.8–10.5)

## 2019-10-29 PROCEDURE — 99232 SBSQ HOSP IP/OBS MODERATE 35: CPT

## 2019-10-29 PROCEDURE — 99233 SBSQ HOSP IP/OBS HIGH 50: CPT

## 2019-10-29 RX ORDER — ACETAMINOPHEN 500 MG
650 TABLET ORAL EVERY 6 HOURS
Refills: 0 | Status: DISCONTINUED | OUTPATIENT
Start: 2019-10-29 | End: 2019-11-08

## 2019-10-29 RX ADMIN — ATORVASTATIN CALCIUM 20 MILLIGRAM(S): 80 TABLET, FILM COATED ORAL at 20:48

## 2019-10-29 RX ADMIN — INSULIN GLARGINE 29 UNIT(S): 100 INJECTION, SOLUTION SUBCUTANEOUS at 20:48

## 2019-10-29 RX ADMIN — Medication 10 UNIT(S): at 12:30

## 2019-10-29 RX ADMIN — Medication 4: at 12:29

## 2019-10-29 RX ADMIN — Medication 10 UNIT(S): at 08:10

## 2019-10-29 RX ADMIN — Medication 650 MILLIGRAM(S): at 16:44

## 2019-10-29 RX ADMIN — Medication 650 MILLIGRAM(S): at 23:05

## 2019-10-29 RX ADMIN — MONTELUKAST 10 MILLIGRAM(S): 4 TABLET, CHEWABLE ORAL at 09:16

## 2019-10-29 RX ADMIN — Medication 150 MILLIGRAM(S): at 09:16

## 2019-10-29 RX ADMIN — Medication 2: at 20:48

## 2019-10-29 RX ADMIN — LISINOPRIL 2.5 MILLIGRAM(S): 2.5 TABLET ORAL at 09:16

## 2019-10-29 NOTE — PROGRESS NOTE ADULT - SUBJECTIVE AND OBJECTIVE BOX
CC/Reason for Consult: Elevated Creatinine     SUBJECTIVE / OVERNIGHT EVENTS:  Patient denies CP, SOB, n/v LE swelling. She does report poor intake of water.     MEDICATIONS  (STANDING):  atorvastatin 20 milliGRAM(s) Oral at bedtime  dextrose 50% Injectable 25 Gram(s) IV Push once  insulin glargine Injectable (LANTUS) 29 Unit(s) SubCutaneous at bedtime  insulin lispro (HumaLOG) corrective regimen sliding scale   SubCutaneous three times a day before meals  insulin lispro (HumaLOG) corrective regimen sliding scale   SubCutaneous at bedtime  insulin lispro Injectable (HumaLOG) 10 Unit(s) SubCutaneous three times a day before meals  montelukast 10 milliGRAM(s) Oral daily  petrolatum white Ointment 1 Application(s) Topical every 12 hours  triamcinolone 0.1% Cream 1 Application(s) Topical two times a day  venlafaxine  milliGRAM(s) Oral daily    MEDICATIONS  (PRN):  glucagon  Injectable 1 milliGRAM(s) IntraMuscular once PRN Glucose LESS THAN 70 milligrams/deciliter  LORazepam     Tablet 1 milliGRAM(s) Oral every 6 hours PRN Agitation  LORazepam   Injectable 1 milliGRAM(s) IntraMuscular every 6 hours PRN Agitation        CAPILLARY BLOOD GLUCOSE      POCT Blood Glucose.: 205 mg/dL (29 Oct 2019 12:09)  POCT Blood Glucose.: 78 mg/dL (29 Oct 2019 08:11)  POCT Blood Glucose.: 122 mg/dL (28 Oct 2019 20:07)  POCT Blood Glucose.: 173 mg/dL (28 Oct 2019 16:36)        PHYSICAL EXAM:  Vital Signs Last 24 Hrs  T(C): 37.2 (29 Oct 2019 08:38), Max: 37.2 (29 Oct 2019 08:38)  T(F): 99 (29 Oct 2019 08:38), Max: 99 (29 Oct 2019 08:38)  HR: -- 93  (29 Oct 2019 08:38)  BP: --156/72   (29 Oct 2019 08:38)  RR: 18 (29 Oct 2019 08:38) (18 - 18)  PHYSICAL EXAM:  GENERAL: NAD,  HEAD:  Atraumatic, Normocephalic  EYES: EOMI, conjunctiva and sclera clear  NECK: supple   CHEST/LUNG: Clear to auscultation bilaterally; No wheeze  HEART: Regular rate and rhythm; No murmurs, rubs, or gallops  ABDOMEN: Soft, Nontender, Nondistended;   EXTREMITIES:  No clubbing, cyanosis, or edema  PSYCH: calm  NEUROLOGY: non-focal      LABS:                        10.9   11.16 )-----------( 456      ( 29 Oct 2019 08:11 )             34.7     10-29    140  |  103  |  75<H>  ----------------------------<  75  4.7   |  22  |  2.36<H>    Ca    9.6      29 Oct 2019 08:11    TPro  8.3  /  Alb  3.6  /  TBili  < 0.2<L>  /  DBili  x   /  AST  18  /  ALT  14  /  AlkPhos  79  10-29

## 2019-10-29 NOTE — PROGRESS NOTE ADULT - ASSESSMENT
65 yo woman with history of depression, poorly controlled Type 2 Diabetes , HTN, HLD, CKD3, admitted to [facility] for depression.       ?CKD vs CRISTÓBAL- Unclear baseline. However on admission pt w/ creatinine between 1.74 and 1.94. Pt Cr continues to rise and is now 2.36 today. Likely a combination of poor PO and uncontrolled DM2. Pt denies hx of renal fxn but states she does not follow up routinely with doctors.   -dc lisinopril for now  -encourage PO hydration as much as possible  -Monitor Cr, repeat in 2-3 days after adequate PO hyrdation   -avoid nephrotoxins   -renally dose meds  -if levels continue to increase will consider obtaining urine studies +/- renal U/S for further evaluation      DM type 2 poorly controlled - A1c of 15.9  - c/w current basal/bolus regimen  - c/w ISS  - Diabetic diet   -Will need to monitor FS and insulin regimen very closely if creatinine continues to rise   -On discharge Given her A1c level she will need basal bolus with premeal insulin and outpatient endocrine appointment.     HTN - Not optimal today. However over the past couple days SBPs has been between the high 90s and low 100s.   -Would d/c lisinopril for now until her creatinine stabilizes more    -monitor off of antihypertensives for now but if remains above goal will add alternative antihypertensive     HLD   - Continue atorvastatin    Depression - Management Per psychiatry

## 2019-10-30 PROCEDURE — 99232 SBSQ HOSP IP/OBS MODERATE 35: CPT

## 2019-10-30 RX ADMIN — Medication 2: at 12:45

## 2019-10-30 RX ADMIN — ATORVASTATIN CALCIUM 20 MILLIGRAM(S): 80 TABLET, FILM COATED ORAL at 21:55

## 2019-10-30 RX ADMIN — Medication 10 UNIT(S): at 12:45

## 2019-10-30 RX ADMIN — Medication 10 UNIT(S): at 17:14

## 2019-10-30 RX ADMIN — INSULIN GLARGINE 29 UNIT(S): 100 INJECTION, SOLUTION SUBCUTANEOUS at 21:55

## 2019-10-30 RX ADMIN — MONTELUKAST 10 MILLIGRAM(S): 4 TABLET, CHEWABLE ORAL at 09:15

## 2019-10-30 RX ADMIN — Medication 650 MILLIGRAM(S): at 23:00

## 2019-10-30 RX ADMIN — Medication 150 MILLIGRAM(S): at 09:15

## 2019-10-31 PROCEDURE — 99232 SBSQ HOSP IP/OBS MODERATE 35: CPT

## 2019-10-31 PROCEDURE — 99233 SBSQ HOSP IP/OBS HIGH 50: CPT

## 2019-10-31 RX ADMIN — Medication 150 MILLIGRAM(S): at 09:40

## 2019-10-31 RX ADMIN — Medication 1 APPLICATION(S): at 21:56

## 2019-10-31 RX ADMIN — ATORVASTATIN CALCIUM 20 MILLIGRAM(S): 80 TABLET, FILM COATED ORAL at 21:56

## 2019-10-31 RX ADMIN — Medication 10 UNIT(S): at 12:27

## 2019-10-31 RX ADMIN — Medication 8: at 17:13

## 2019-10-31 RX ADMIN — INSULIN GLARGINE 29 UNIT(S): 100 INJECTION, SOLUTION SUBCUTANEOUS at 21:40

## 2019-10-31 RX ADMIN — Medication 2: at 12:27

## 2019-10-31 RX ADMIN — Medication 10 UNIT(S): at 17:13

## 2019-10-31 RX ADMIN — Medication 6: at 21:37

## 2019-10-31 RX ADMIN — MONTELUKAST 10 MILLIGRAM(S): 4 TABLET, CHEWABLE ORAL at 09:40

## 2019-10-31 NOTE — PROGRESS NOTE ADULT - ASSESSMENT
63 yo woman with history of depression, poorly controlled Type 2 Diabetes , HTN, HLD, CKD3, admitted to [facility] for depression.       ?CKD vs CRISTÓBAL- Unclear baseline. However on admission pt w/ creatinine between 1.74 and 1.94 then continued to rise to 2.36 . Likely a combination of poor PO and recent lisinopril use.  Pt denies hx of renal fxn but states she does not follow up routinely with doctors. She also has uncontrolled DM.   -continue to hold lisinopril for now  -encourage PO hydration as much as possible  -Monitor Cr, f/u repeat levels tomorrow 11/1/19   -avoid nephrotoxins   -renally dose meds  -if levels continue to increase will consider obtaining urine studies +/- renal U/S for further evaluation      DM type 2 poorly controlled - A1c of 15.9. FS improved here but now w/ evidence of am hypoglycemia.   - c/w ISS and 10U humalog qac . Decrease lantus to 25U.  - Diabetic diet   -Will need to monitor FS and insulin regimen very closely if creatinine continues to rise , f/u creatinine level tomorroa 11/1/19  -On discharge Given her A1c level she will need basal bolus with premeal insulin and outpatient endocrine appointment.     HTN - SBPs intermittently between the high 90s and low 100s. Pt has been asymptomatic  -continue to hold lisinopril for now     HLD   - Continue atorvastatin    Depression - Management Per psychiatry 65 yo woman with history of depression, poorly controlled Type 2 Diabetes , HTN, HLD, CKD3, admitted to [facility] for depression.       ?CKD vs CRISTÓBAL- Unclear baseline. However on admission pt w/ creatinine between 1.74 and 1.94 then continued to rise to 2.36 . Likely a combination of poor PO and recent lisinopril use.  Pt denies hx of renal fxn but states she does not follow up routinely with doctors. She also has uncontrolled DM.   -continue to hold lisinopril for now  -encourage PO hydration as much as possible  -Monitor Cr, f/u repeat levels tomorrow 11/1/19   -avoid nephrotoxins   -renally dose meds  -if levels continue to increase will consider obtaining urine studies +/- renal U/S for further evaluation      DM type 2 poorly controlled - A1c of 15.9. FS improved here but now w/ evidence of am hypoglycemia.   - c/w ISS and 10U humalog qac . Decrease lantus to 26U.  - Diabetic diet   -Will need to monitor FS and insulin regimen very closely if creatinine continues to rise , f/u creatinine level tomorroa 11/1/19  -On discharge Given her A1c level she will need basal bolus with premeal insulin and outpatient endocrine appointment.     HTN - SBPs intermittently between the high 90s and low 100s. Pt has been asymptomatic  -continue to hold lisinopril for now     HLD   - Continue atorvastatin    Depression - Management Per psychiatry

## 2019-10-31 NOTE — PROGRESS NOTE ADULT - SUBJECTIVE AND OBJECTIVE BOX
Patient is a 64y old  Female who presents with a chief complaint of depression (26 Oct 2019 12:05)      SUBJECTIVE / OVERNIGHT EVENTS:    Review of Systems:     MEDICATIONS  (STANDING):  atorvastatin 20 milliGRAM(s) Oral at bedtime  dextrose 50% Injectable 25 Gram(s) IV Push once  insulin glargine Injectable (LANTUS) 29 Unit(s) SubCutaneous at bedtime  insulin lispro (HumaLOG) corrective regimen sliding scale   SubCutaneous three times a day before meals  insulin lispro (HumaLOG) corrective regimen sliding scale   SubCutaneous at bedtime  insulin lispro Injectable (HumaLOG) 10 Unit(s) SubCutaneous three times a day before meals  montelukast 10 milliGRAM(s) Oral daily  petrolatum white Ointment 1 Application(s) Topical every 12 hours  triamcinolone 0.1% Cream 1 Application(s) Topical two times a day  venlafaxine  milliGRAM(s) Oral daily    MEDICATIONS  (PRN):  acetaminophen   Tablet .. 650 milliGRAM(s) Oral every 6 hours PRN Moderate Pain (4 - 6)  glucagon  Injectable 1 milliGRAM(s) IntraMuscular once PRN Glucose LESS THAN 70 milligrams/deciliter  LORazepam     Tablet 1 milliGRAM(s) Oral every 6 hours PRN Agitation  LORazepam   Injectable 1 milliGRAM(s) IntraMuscular every 6 hours PRN Agitation      PHYSICAL EXAM:  Vital Signs Last 24 Hrs  T(C): 36.7 (31 Oct 2019 08:28), Max: 36.7 (31 Oct 2019 08:28)  T(F): 98.1 (31 Oct 2019 08:28), Max: 98.1 (31 Oct 2019 08:28)  HR: -- 104(31 Oct 2019 08:28)  BP: -- 100/74  (31 Oct 2019 08:28)  BP(mean): --  RR: 18 (31 Oct 2019 08:28) (18 - 18)  SpO2: --  LABS:  CAPILLARY BLOOD GLUCOSE      POCT Blood Glucose.: 198 mg/dL (31 Oct 2019 12:18)  POCT Blood Glucose.: 105 mg/dL (31 Oct 2019 08:25)  POCT Blood Glucose.: 59 mg/dL (31 Oct 2019 07:54)  POCT Blood Glucose.: 233 mg/dL (30 Oct 2019 21:25)  POCT Blood Glucose.: 89 mg/dL (30 Oct 2019 16:35)                      RADIOLOGY & ADDITIONAL TESTS:    Imaging Personally Reviewed:    Consultant(s) Notes Reviewed:      Care Discussed with Consultants/Other Providers: Patient is a 64y old  Female who presents with a chief complaint of depression (26 Oct 2019 12:05)      SUBJECTIVE / OVERNIGHT EVENTS: Pt w/ no complaints. She denies having symptoms when her FS are low. She denies tachycardia, n/v, tremulousness or diaphoresis. She reports poor water intake. She denies CP, SOB, lightheadeness.     Review of Systems:     MEDICATIONS  (STANDING):  atorvastatin 20 milliGRAM(s) Oral at bedtime  dextrose 50% Injectable 25 Gram(s) IV Push once  insulin glargine Injectable (LANTUS) 29 Unit(s) SubCutaneous at bedtime  insulin lispro (HumaLOG) corrective regimen sliding scale   SubCutaneous three times a day before meals  insulin lispro (HumaLOG) corrective regimen sliding scale   SubCutaneous at bedtime  insulin lispro Injectable (HumaLOG) 10 Unit(s) SubCutaneous three times a day before meals  montelukast 10 milliGRAM(s) Oral daily  petrolatum white Ointment 1 Application(s) Topical every 12 hours  triamcinolone 0.1% Cream 1 Application(s) Topical two times a day  venlafaxine  milliGRAM(s) Oral daily    MEDICATIONS  (PRN):  acetaminophen   Tablet .. 650 milliGRAM(s) Oral every 6 hours PRN Moderate Pain (4 - 6)  glucagon  Injectable 1 milliGRAM(s) IntraMuscular once PRN Glucose LESS THAN 70 milligrams/deciliter  LORazepam     Tablet 1 milliGRAM(s) Oral every 6 hours PRN Agitation  LORazepam   Injectable 1 milliGRAM(s) IntraMuscular every 6 hours PRN Agitation      PHYSICAL EXAM:  Vital Signs Last 24 Hrs  T(C): 36.7 (31 Oct 2019 08:28), Max: 36.7 (31 Oct 2019 08:28)  T(F): 98.1 (31 Oct 2019 08:28), Max: 98.1 (31 Oct 2019 08:28)  HR: -- 104(31 Oct 2019 08:28)  BP: -- 100/74  (31 Oct 2019 08:28)  BP(mean): --  RR: 18 (31 Oct 2019 08:28) (18 - 18)  GENERAL: NAD,  HEAD:  Atraumatic  EYES: EOMI, conjunctiva and sclera clear  NECK: supple   CHEST/LUNG: Clear to auscultation bilaterally; No wheeze  HEART: Regular rate and rhythm; No murmurs, rubs, or gallops  ABDOMEN: Soft, Nontender, Nondistended;   PSYCH: calm      SpO2: --  LABS:  CAPILLARY BLOOD GLUCOSE      POCT Blood Glucose.: 198 mg/dL (31 Oct 2019 12:18)  POCT Blood Glucose.: 105 mg/dL (31 Oct 2019 08:25)  POCT Blood Glucose.: 59 mg/dL (31 Oct 2019 07:54)  POCT Blood Glucose.: 233 mg/dL (30 Oct 2019 21:25)  POCT Blood Glucose.: 89 mg/dL (30 Oct 2019 16:35)

## 2019-11-01 LAB
ANION GAP SERPL CALC-SCNC: 16 MMO/L — HIGH (ref 7–14)
BUN SERPL-MCNC: 69 MG/DL — HIGH (ref 7–23)
CALCIUM SERPL-MCNC: 9.2 MG/DL — SIGNIFICANT CHANGE UP (ref 8.4–10.5)
CHLORIDE SERPL-SCNC: 103 MMOL/L — SIGNIFICANT CHANGE UP (ref 98–107)
CO2 SERPL-SCNC: 20 MMOL/L — LOW (ref 22–31)
CREAT SERPL-MCNC: 2.04 MG/DL — HIGH (ref 0.5–1.3)
GLUCOSE SERPL-MCNC: 129 MG/DL — HIGH (ref 70–99)
POTASSIUM SERPL-MCNC: 4.6 MMOL/L — SIGNIFICANT CHANGE UP (ref 3.5–5.3)
POTASSIUM SERPL-SCNC: 4.6 MMOL/L — SIGNIFICANT CHANGE UP (ref 3.5–5.3)
SODIUM SERPL-SCNC: 139 MMOL/L — SIGNIFICANT CHANGE UP (ref 135–145)

## 2019-11-01 PROCEDURE — 90853 GROUP PSYCHOTHERAPY: CPT

## 2019-11-01 PROCEDURE — 99232 SBSQ HOSP IP/OBS MODERATE 35: CPT

## 2019-11-01 RX ORDER — INSULIN GLARGINE 100 [IU]/ML
26 INJECTION, SOLUTION SUBCUTANEOUS AT BEDTIME
Refills: 0 | Status: DISCONTINUED | OUTPATIENT
Start: 2019-11-01 | End: 2019-11-05

## 2019-11-01 RX ADMIN — Medication 10 UNIT(S): at 12:33

## 2019-11-01 RX ADMIN — INSULIN GLARGINE 26 UNIT(S): 100 INJECTION, SOLUTION SUBCUTANEOUS at 22:14

## 2019-11-01 RX ADMIN — Medication 1 APPLICATION(S): at 08:51

## 2019-11-01 RX ADMIN — Medication 10 UNIT(S): at 17:03

## 2019-11-01 RX ADMIN — ATORVASTATIN CALCIUM 20 MILLIGRAM(S): 80 TABLET, FILM COATED ORAL at 22:14

## 2019-11-01 RX ADMIN — Medication 1 APPLICATION(S): at 21:04

## 2019-11-01 RX ADMIN — Medication 150 MILLIGRAM(S): at 08:51

## 2019-11-01 RX ADMIN — MONTELUKAST 10 MILLIGRAM(S): 4 TABLET, CHEWABLE ORAL at 08:50

## 2019-11-01 RX ADMIN — Medication 1 APPLICATION(S): at 22:04

## 2019-11-01 RX ADMIN — Medication 1 APPLICATION(S): at 08:50

## 2019-11-01 RX ADMIN — Medication 10 UNIT(S): at 08:42

## 2019-11-02 RX ADMIN — Medication 10 UNIT(S): at 12:53

## 2019-11-02 RX ADMIN — Medication 1 APPLICATION(S): at 22:14

## 2019-11-02 RX ADMIN — ATORVASTATIN CALCIUM 20 MILLIGRAM(S): 80 TABLET, FILM COATED ORAL at 22:10

## 2019-11-02 RX ADMIN — Medication 150 MILLIGRAM(S): at 09:16

## 2019-11-02 RX ADMIN — Medication 1 APPLICATION(S): at 09:15

## 2019-11-02 RX ADMIN — Medication 6: at 12:47

## 2019-11-02 RX ADMIN — INSULIN GLARGINE 26 UNIT(S): 100 INJECTION, SOLUTION SUBCUTANEOUS at 22:14

## 2019-11-02 RX ADMIN — Medication 10 UNIT(S): at 17:15

## 2019-11-02 RX ADMIN — MONTELUKAST 10 MILLIGRAM(S): 4 TABLET, CHEWABLE ORAL at 09:15

## 2019-11-02 RX ADMIN — Medication 1 APPLICATION(S): at 09:16

## 2019-11-03 RX ORDER — INSULIN LISPRO 100/ML
8 VIAL (ML) SUBCUTANEOUS
Refills: 0 | Status: DISCONTINUED | OUTPATIENT
Start: 2019-11-03 | End: 2019-11-08

## 2019-11-03 RX ADMIN — INSULIN GLARGINE 26 UNIT(S): 100 INJECTION, SOLUTION SUBCUTANEOUS at 21:33

## 2019-11-03 RX ADMIN — Medication 1 APPLICATION(S): at 09:25

## 2019-11-03 RX ADMIN — Medication 10 UNIT(S): at 08:55

## 2019-11-03 RX ADMIN — MONTELUKAST 10 MILLIGRAM(S): 4 TABLET, CHEWABLE ORAL at 09:25

## 2019-11-03 RX ADMIN — Medication 8 UNIT(S): at 17:18

## 2019-11-03 RX ADMIN — Medication 1 APPLICATION(S): at 09:26

## 2019-11-03 RX ADMIN — Medication 1 APPLICATION(S): at 21:34

## 2019-11-03 RX ADMIN — ATORVASTATIN CALCIUM 20 MILLIGRAM(S): 80 TABLET, FILM COATED ORAL at 21:33

## 2019-11-03 RX ADMIN — Medication 150 MILLIGRAM(S): at 09:26

## 2019-11-03 RX ADMIN — Medication 2: at 17:18

## 2019-11-03 RX ADMIN — Medication 10 UNIT(S): at 12:35

## 2019-11-04 PROCEDURE — 99232 SBSQ HOSP IP/OBS MODERATE 35: CPT

## 2019-11-04 RX ADMIN — MONTELUKAST 10 MILLIGRAM(S): 4 TABLET, CHEWABLE ORAL at 08:41

## 2019-11-04 RX ADMIN — INSULIN GLARGINE 26 UNIT(S): 100 INJECTION, SOLUTION SUBCUTANEOUS at 20:52

## 2019-11-04 RX ADMIN — Medication 2: at 12:16

## 2019-11-04 RX ADMIN — Medication 150 MILLIGRAM(S): at 08:41

## 2019-11-04 RX ADMIN — Medication 1 APPLICATION(S): at 08:41

## 2019-11-04 RX ADMIN — Medication 8 UNIT(S): at 12:16

## 2019-11-04 RX ADMIN — Medication 8 UNIT(S): at 16:54

## 2019-11-04 RX ADMIN — ATORVASTATIN CALCIUM 20 MILLIGRAM(S): 80 TABLET, FILM COATED ORAL at 20:52

## 2019-11-05 PROCEDURE — 99232 SBSQ HOSP IP/OBS MODERATE 35: CPT

## 2019-11-05 RX ORDER — INSULIN GLARGINE 100 [IU]/ML
20 INJECTION, SOLUTION SUBCUTANEOUS
Qty: 0 | Refills: 0 | DISCHARGE

## 2019-11-05 RX ORDER — PETROLATUM,WHITE
1 JELLY (GRAM) TOPICAL
Qty: 0 | Refills: 0 | DISCHARGE
Start: 2019-11-05

## 2019-11-05 RX ORDER — MONTELUKAST 4 MG/1
1 TABLET, CHEWABLE ORAL
Qty: 30 | Refills: 0
Start: 2019-11-05 | End: 2019-12-04

## 2019-11-05 RX ORDER — INSULIN LISPRO 100/ML
8 VIAL (ML) SUBCUTANEOUS
Qty: 2 | Refills: 0
Start: 2019-11-05 | End: 2019-11-18

## 2019-11-05 RX ORDER — ENOXAPARIN SODIUM 100 MG/ML
26 INJECTION SUBCUTANEOUS
Qty: 1 | Refills: 0
Start: 2019-11-05 | End: 2019-11-18

## 2019-11-05 RX ORDER — VENLAFAXINE HCL 75 MG
1 CAPSULE, EXT RELEASE 24 HR ORAL
Qty: 0 | Refills: 0 | DISCHARGE

## 2019-11-05 RX ORDER — ATORVASTATIN CALCIUM 80 MG/1
1 TABLET, FILM COATED ORAL
Qty: 0 | Refills: 0 | DISCHARGE

## 2019-11-05 RX ORDER — ATORVASTATIN CALCIUM 80 MG/1
1 TABLET, FILM COATED ORAL
Qty: 30 | Refills: 0
Start: 2019-11-05 | End: 2019-12-04

## 2019-11-05 RX ORDER — ISOPROPYL ALCOHOL, BENZOCAINE .7; .06 ML/ML; ML/ML
1 SWAB TOPICAL
Qty: 100 | Refills: 0
Start: 2019-11-05 | End: 2019-11-18

## 2019-11-05 RX ORDER — MONTELUKAST 4 MG/1
1 TABLET, CHEWABLE ORAL
Qty: 0 | Refills: 0 | DISCHARGE

## 2019-11-05 RX ORDER — VENLAFAXINE HCL 75 MG
1 CAPSULE, EXT RELEASE 24 HR ORAL
Qty: 30 | Refills: 0
Start: 2019-11-05 | End: 2019-12-04

## 2019-11-05 RX ORDER — INSULIN GLARGINE 100 [IU]/ML
22 INJECTION, SOLUTION SUBCUTANEOUS AT BEDTIME
Refills: 0 | Status: DISCONTINUED | OUTPATIENT
Start: 2019-11-05 | End: 2019-11-06

## 2019-11-05 RX ORDER — LISINOPRIL 2.5 MG/1
1 TABLET ORAL
Qty: 0 | Refills: 0 | DISCHARGE

## 2019-11-05 RX ADMIN — Medication 8 UNIT(S): at 16:55

## 2019-11-05 RX ADMIN — INSULIN GLARGINE 22 UNIT(S): 100 INJECTION, SOLUTION SUBCUTANEOUS at 21:43

## 2019-11-05 RX ADMIN — ATORVASTATIN CALCIUM 20 MILLIGRAM(S): 80 TABLET, FILM COATED ORAL at 21:53

## 2019-11-05 RX ADMIN — Medication 8 UNIT(S): at 08:47

## 2019-11-05 RX ADMIN — Medication 1 APPLICATION(S): at 09:18

## 2019-11-05 RX ADMIN — Medication 150 MILLIGRAM(S): at 09:18

## 2019-11-05 RX ADMIN — MONTELUKAST 10 MILLIGRAM(S): 4 TABLET, CHEWABLE ORAL at 09:18

## 2019-11-05 RX ADMIN — Medication 8 UNIT(S): at 12:21

## 2019-11-06 PROCEDURE — 99232 SBSQ HOSP IP/OBS MODERATE 35: CPT

## 2019-11-06 PROCEDURE — 99233 SBSQ HOSP IP/OBS HIGH 50: CPT

## 2019-11-06 RX ORDER — INSULIN GLARGINE 100 [IU]/ML
18 INJECTION, SOLUTION SUBCUTANEOUS AT BEDTIME
Refills: 0 | Status: DISCONTINUED | OUTPATIENT
Start: 2019-11-06 | End: 2019-11-07

## 2019-11-06 RX ORDER — VENLAFAXINE HCL 75 MG
1 CAPSULE, EXT RELEASE 24 HR ORAL
Qty: 30 | Refills: 0
Start: 2019-11-06 | End: 2019-12-05

## 2019-11-06 RX ADMIN — MONTELUKAST 10 MILLIGRAM(S): 4 TABLET, CHEWABLE ORAL at 10:38

## 2019-11-06 RX ADMIN — Medication 1 APPLICATION(S): at 22:59

## 2019-11-06 RX ADMIN — Medication 2: at 12:28

## 2019-11-06 RX ADMIN — Medication 8 UNIT(S): at 17:26

## 2019-11-06 RX ADMIN — ATORVASTATIN CALCIUM 20 MILLIGRAM(S): 80 TABLET, FILM COATED ORAL at 22:06

## 2019-11-06 RX ADMIN — INSULIN GLARGINE 18 UNIT(S): 100 INJECTION, SOLUTION SUBCUTANEOUS at 21:59

## 2019-11-06 RX ADMIN — Medication 150 MILLIGRAM(S): at 10:39

## 2019-11-06 RX ADMIN — Medication 8 UNIT(S): at 10:38

## 2019-11-06 RX ADMIN — Medication 6: at 17:26

## 2019-11-06 NOTE — PROGRESS NOTE ADULT - PROVIDER SPECIALTY LIST ADULT
Hospitalist

## 2019-11-06 NOTE — PROGRESS NOTE ADULT - ASSESSMENT
65 yo woman with history of depression, poorly controlled Type 2 Diabetes , HTN, HLD, CKD3, admitted to [facility] for depression.     DM type 2 poorly controlled - A1c of 15.9. FS improved here but now w/ evidence of am hypoglycemia.   - c/w ISS and 10U humalog qac . Decrease lantus to 18U.  - Diabetic diet   - Will need to monitor FS and insulin regimen very closely  - On discharge Given her A1c level she will need basal bolus with premeal insulin and outpatient endocrine appointment.       CKD vs CRISTÓBAL- Unclear baseline but sCr appears stable. No signs of hypervolemia or uremia.  -avoid nephrotoxins   -renally dose meds    HTN - SBPs intermittently between the high 90s and low 100s. Pt has been asymptomatic  -continue to hold lisinopril for now     HLD   - Continue atorvastatin    Depression - Management Per psychiatry

## 2019-11-07 PROCEDURE — 99231 SBSQ HOSP IP/OBS SF/LOW 25: CPT

## 2019-11-07 RX ORDER — INSULIN LISPRO 100/ML
8 VIAL (ML) SUBCUTANEOUS
Qty: 2 | Refills: 0
Start: 2019-11-07 | End: 2019-11-13

## 2019-11-07 RX ORDER — INSULIN GLARGINE 100 [IU]/ML
15 INJECTION, SOLUTION SUBCUTANEOUS AT BEDTIME
Refills: 0 | Status: DISCONTINUED | OUTPATIENT
Start: 2019-11-07 | End: 2019-11-08

## 2019-11-07 RX ORDER — ENOXAPARIN SODIUM 100 MG/ML
15 INJECTION SUBCUTANEOUS
Qty: 2 | Refills: 0
Start: 2019-11-07 | End: 2019-11-13

## 2019-11-07 RX ADMIN — Medication 150 MILLIGRAM(S): at 08:41

## 2019-11-07 RX ADMIN — INSULIN GLARGINE 15 UNIT(S): 100 INJECTION, SOLUTION SUBCUTANEOUS at 21:31

## 2019-11-07 RX ADMIN — Medication 4: at 21:00

## 2019-11-07 RX ADMIN — MONTELUKAST 10 MILLIGRAM(S): 4 TABLET, CHEWABLE ORAL at 08:41

## 2019-11-07 RX ADMIN — ATORVASTATIN CALCIUM 20 MILLIGRAM(S): 80 TABLET, FILM COATED ORAL at 21:31

## 2019-11-07 RX ADMIN — Medication 1 APPLICATION(S): at 21:32

## 2019-11-07 RX ADMIN — Medication 8 UNIT(S): at 12:33

## 2019-11-07 RX ADMIN — Medication 4: at 12:32

## 2019-11-08 VITALS — TEMPERATURE: 98 F | RESPIRATION RATE: 18 BRPM

## 2019-11-08 PROCEDURE — 99238 HOSP IP/OBS DSCHRG MGMT 30/<: CPT

## 2019-11-08 RX ADMIN — Medication 1 APPLICATION(S): at 08:33

## 2019-11-08 RX ADMIN — Medication 150 MILLIGRAM(S): at 08:33

## 2019-11-08 RX ADMIN — MONTELUKAST 10 MILLIGRAM(S): 4 TABLET, CHEWABLE ORAL at 08:33

## 2019-11-08 RX ADMIN — Medication 8 UNIT(S): at 12:27

## 2019-11-08 RX ADMIN — Medication 8 UNIT(S): at 08:13

## 2020-02-19 ENCOUNTER — INPATIENT (INPATIENT)
Facility: HOSPITAL | Age: 65
LOS: 2 days | Discharge: HOME HEALTH | End: 2020-02-22
Attending: STUDENT IN AN ORGANIZED HEALTH CARE EDUCATION/TRAINING PROGRAM | Admitting: INTERNAL MEDICINE
Payer: MEDICAID

## 2020-02-19 PROCEDURE — 99285 EMERGENCY DEPT VISIT HI MDM: CPT

## 2020-02-19 PROCEDURE — 99223 1ST HOSP IP/OBS HIGH 75: CPT | Mod: 25

## 2020-02-19 PROCEDURE — 93458 L HRT ARTERY/VENTRICLE ANGIO: CPT | Mod: 26,59

## 2020-02-19 PROCEDURE — 92941 PRQ TRLML REVSC TOT OCCL AMI: CPT | Mod: RC

## 2020-02-20 PROBLEM — Z00.00 ENCOUNTER FOR PREVENTIVE HEALTH EXAMINATION: Status: ACTIVE | Noted: 2020-02-20

## 2020-02-20 PROCEDURE — 93306 TTE W/DOPPLER COMPLETE: CPT | Mod: 26

## 2020-02-20 PROCEDURE — 99233 SBSQ HOSP IP/OBS HIGH 50: CPT

## 2020-02-20 PROCEDURE — 76856 US EXAM PELVIC COMPLETE: CPT | Mod: 26

## 2020-02-20 PROCEDURE — 76770 US EXAM ABDO BACK WALL COMP: CPT | Mod: 26

## 2020-02-21 PROBLEM — F32.9 MAJOR DEPRESSIVE DISORDER, SINGLE EPISODE, UNSPECIFIED: Chronic | Status: ACTIVE | Noted: 2019-10-17

## 2020-02-21 PROBLEM — N18.9 CHRONIC KIDNEY DISEASE, UNSPECIFIED: Chronic | Status: ACTIVE | Noted: 2019-10-17

## 2020-02-21 PROBLEM — I10 ESSENTIAL (PRIMARY) HYPERTENSION: Chronic | Status: ACTIVE | Noted: 2019-10-17

## 2020-02-21 PROBLEM — E11.9 TYPE 2 DIABETES MELLITUS WITHOUT COMPLICATIONS: Chronic | Status: ACTIVE | Noted: 2019-10-17

## 2020-02-21 PROCEDURE — 99233 SBSQ HOSP IP/OBS HIGH 50: CPT

## 2020-02-22 ENCOUNTER — OUTPATIENT (OUTPATIENT)
Dept: OUTPATIENT SERVICES | Facility: HOSPITAL | Age: 65
LOS: 1 days | End: 2020-02-22

## 2020-02-22 PROCEDURE — 99223 1ST HOSP IP/OBS HIGH 75: CPT

## 2020-02-26 ENCOUNTER — APPOINTMENT (OUTPATIENT)
Dept: CARDIOLOGY | Facility: CLINIC | Age: 65
End: 2020-02-26
Payer: MEDICAID

## 2020-02-26 VITALS
HEART RATE: 91 BPM | SYSTOLIC BLOOD PRESSURE: 132 MMHG | HEIGHT: 60 IN | OXYGEN SATURATION: 97 % | DIASTOLIC BLOOD PRESSURE: 64 MMHG | RESPIRATION RATE: 18 BRPM | BODY MASS INDEX: 29.45 KG/M2 | WEIGHT: 150 LBS

## 2020-02-26 DIAGNOSIS — I25.10 ATHEROSCLEROTIC HEART DISEASE OF NATIVE CORONARY ARTERY W/OUT ANGINA PECTORIS: ICD-10-CM

## 2020-02-26 DIAGNOSIS — Z82.49 FAMILY HISTORY OF ISCHEMIC HEART DISEASE AND OTHER DISEASES OF THE CIRCULATORY SYSTEM: ICD-10-CM

## 2020-02-26 DIAGNOSIS — I10 ESSENTIAL (PRIMARY) HYPERTENSION: ICD-10-CM

## 2020-02-26 DIAGNOSIS — E78.5 HYPERLIPIDEMIA, UNSPECIFIED: ICD-10-CM

## 2020-02-26 DIAGNOSIS — Z86.39 PERSONAL HISTORY OF OTHER ENDOCRINE, NUTRITIONAL AND METABOLIC DISEASE: ICD-10-CM

## 2020-02-26 DIAGNOSIS — F17.200 NICOTINE DEPENDENCE, UNSPECIFIED, UNCOMPLICATED: ICD-10-CM

## 2020-02-26 DIAGNOSIS — E11.9 TYPE 2 DIABETES MELLITUS W/OUT COMPLICATIONS: ICD-10-CM

## 2020-02-26 DIAGNOSIS — R41.3 OTHER AMNESIA: ICD-10-CM

## 2020-02-26 DIAGNOSIS — Z72.3 LACK OF PHYSICAL EXERCISE: ICD-10-CM

## 2020-02-26 PROCEDURE — 99214 OFFICE O/P EST MOD 30 MIN: CPT

## 2020-02-26 RX ORDER — TICAGRELOR 90 MG/1
90 TABLET ORAL
Qty: 180 | Refills: 3 | Status: ACTIVE | COMMUNITY
Start: 1900-01-01 | End: 1900-01-01

## 2020-02-26 RX ORDER — METOPROLOL SUCCINATE 25 MG/1
25 TABLET, EXTENDED RELEASE ORAL DAILY
Qty: 90 | Refills: 3 | Status: ACTIVE | COMMUNITY

## 2020-02-26 RX ORDER — VENLAFAXINE HYDROCHLORIDE 150 MG/1
150 CAPSULE, EXTENDED RELEASE ORAL DAILY
Refills: 0 | Status: ACTIVE | COMMUNITY

## 2020-02-26 RX ORDER — INSULIN LISPRO 100 [IU]/ML
100 INJECTION, SOLUTION INTRAVENOUS; SUBCUTANEOUS
Refills: 0 | Status: ACTIVE | COMMUNITY

## 2020-02-26 RX ORDER — ATORVASTATIN CALCIUM 80 MG/1
80 TABLET, FILM COATED ORAL
Qty: 90 | Refills: 1 | Status: ACTIVE | COMMUNITY

## 2020-02-26 NOTE — ASSESSMENT
[FreeTextEntry1] : No cp or sob since hospital stay.\par \par CAD: The patient is status post coronary stenting.  The patient is tolerating dual antiplatelet therapy.  The need for compliance has been reviewed.  The patient and her sister verbalized an understanding.\par \par Hypertension: Well-controlled.\par \par Hyperlipidemia: The patient is tolerating statin therapy.  LFTs and fasting cholesterol profile have been arranged.\par \par Renal insufficiency: Repeat basic metabolic panel has been arranged.

## 2020-02-26 NOTE — PHYSICAL EXAM
[General Appearance - Well Developed] : well developed [Normal Appearance] : normal appearance [General Appearance - Well Nourished] : well nourished [Well Groomed] : well groomed [No Deformities] : no deformities [General Appearance - In No Acute Distress] : no acute distress [Eyelids - No Xanthelasma] : the eyelids demonstrated no xanthelasmas [Normal Conjunctiva] : the conjunctiva exhibited no abnormalities [Normal Oral Mucosa] : normal oral mucosa [No Oral Pallor] : no oral pallor [No Oral Cyanosis] : no oral cyanosis [Normal Jugular Venous V Waves Present] : normal jugular venous V waves present [Normal Jugular Venous A Waves Present] : normal jugular venous A waves present [] : no respiratory distress [No Jugular Venous Camacho A Waves] : no jugular venous camacho A waves [Exaggerated Use Of Accessory Muscles For Inspiration] : no accessory muscle use [Auscultation Breath Sounds / Voice Sounds] : lungs were clear to auscultation bilaterally [Respiration, Rhythm And Depth] : normal respiratory rhythm and effort [Heart Rate And Rhythm] : heart rate and rhythm were normal [Heart Sounds] : normal S1 and S2 [Murmurs] : no murmurs present

## 2020-03-29 ENCOUNTER — EMERGENCY (EMERGENCY)
Facility: HOSPITAL | Age: 65
LOS: 1 days | End: 2020-03-29
Admitting: EMERGENCY MEDICINE
Payer: MEDICAID

## 2020-03-29 PROCEDURE — 99283 EMERGENCY DEPT VISIT LOW MDM: CPT

## 2020-04-12 ENCOUNTER — EMERGENCY (EMERGENCY)
Facility: HOSPITAL | Age: 65
LOS: 1 days | End: 2020-04-12
Admitting: EMERGENCY MEDICINE
Payer: MEDICAID

## 2020-04-12 PROCEDURE — 99283 EMERGENCY DEPT VISIT LOW MDM: CPT

## 2021-03-24 ENCOUNTER — TRANSCRIPTION ENCOUNTER (OUTPATIENT)
Age: 66
End: 2021-03-24

## 2021-04-03 ENCOUNTER — OUTPATIENT (OUTPATIENT)
Dept: OUTPATIENT SERVICES | Facility: HOSPITAL | Age: 66
LOS: 1 days | End: 2021-04-03

## 2021-04-03 ENCOUNTER — INPATIENT (INPATIENT)
Facility: HOSPITAL | Age: 66
LOS: 5 days | Discharge: NURSING, REHAB OR LONG-TERM CARE | End: 2021-04-09
Payer: MEDICAID

## 2021-04-03 PROCEDURE — 93010 ELECTROCARDIOGRAM REPORT: CPT

## 2021-04-03 PROCEDURE — 71045 X-RAY EXAM CHEST 1 VIEW: CPT | Mod: 26

## 2021-04-03 PROCEDURE — 99285 EMERGENCY DEPT VISIT HI MDM: CPT

## 2021-04-04 ENCOUNTER — OUTPATIENT (OUTPATIENT)
Dept: OUTPATIENT SERVICES | Facility: HOSPITAL | Age: 66
LOS: 1 days | End: 2021-04-04

## 2021-04-04 PROCEDURE — 72192 CT PELVIS W/O DYE: CPT | Mod: 26

## 2021-04-04 PROCEDURE — 93010 ELECTROCARDIOGRAM REPORT: CPT

## 2021-04-05 ENCOUNTER — OUTPATIENT (OUTPATIENT)
Dept: OUTPATIENT SERVICES | Facility: HOSPITAL | Age: 66
LOS: 1 days | End: 2021-04-05

## 2021-04-06 ENCOUNTER — OUTPATIENT (OUTPATIENT)
Dept: OUTPATIENT SERVICES | Facility: HOSPITAL | Age: 66
LOS: 1 days | End: 2021-04-06

## 2021-04-07 ENCOUNTER — OUTPATIENT (OUTPATIENT)
Dept: OUTPATIENT SERVICES | Facility: HOSPITAL | Age: 66
LOS: 1 days | End: 2021-04-07

## 2021-07-09 ENCOUNTER — APPOINTMENT (OUTPATIENT)
Dept: CARDIOLOGY | Facility: CLINIC | Age: 66
End: 2021-07-09

## 2021-08-04 ENCOUNTER — OUTPATIENT (OUTPATIENT)
Dept: OUTPATIENT SERVICES | Facility: HOSPITAL | Age: 66
LOS: 1 days | End: 2021-08-04

## 2021-08-04 ENCOUNTER — INPATIENT (INPATIENT)
Facility: HOSPITAL | Age: 66
LOS: 6 days | Discharge: NURSING, REHAB OR LONG-TERM CARE | End: 2021-08-11
Attending: FAMILY MEDICINE
Payer: MEDICARE

## 2021-08-04 PROCEDURE — 93971 EXTREMITY STUDY: CPT | Mod: 26,RT

## 2021-08-04 PROCEDURE — 93010 ELECTROCARDIOGRAM REPORT: CPT

## 2021-08-04 PROCEDURE — 99285 EMERGENCY DEPT VISIT HI MDM: CPT

## 2021-08-04 PROCEDURE — 71045 X-RAY EXAM CHEST 1 VIEW: CPT | Mod: 26

## 2021-08-05 ENCOUNTER — OUTPATIENT (OUTPATIENT)
Dept: OUTPATIENT SERVICES | Facility: HOSPITAL | Age: 66
LOS: 1 days | End: 2021-08-05

## 2021-08-05 PROCEDURE — 70450 CT HEAD/BRAIN W/O DYE: CPT | Mod: 26

## 2021-08-05 PROCEDURE — 76775 US EXAM ABDO BACK WALL LIM: CPT | Mod: 26

## 2021-08-05 PROCEDURE — 99223 1ST HOSP IP/OBS HIGH 75: CPT

## 2021-08-05 PROCEDURE — 93306 TTE W/DOPPLER COMPLETE: CPT | Mod: 26

## 2021-08-06 ENCOUNTER — OUTPATIENT (OUTPATIENT)
Dept: OUTPATIENT SERVICES | Facility: HOSPITAL | Age: 66
LOS: 1 days | End: 2021-08-06

## 2021-08-06 PROCEDURE — 99233 SBSQ HOSP IP/OBS HIGH 50: CPT

## 2021-08-07 ENCOUNTER — OUTPATIENT (OUTPATIENT)
Dept: OUTPATIENT SERVICES | Facility: HOSPITAL | Age: 66
LOS: 1 days | End: 2021-08-07

## 2021-08-08 ENCOUNTER — OUTPATIENT (OUTPATIENT)
Dept: OUTPATIENT SERVICES | Facility: HOSPITAL | Age: 66
LOS: 1 days | End: 2021-08-08

## 2021-08-09 ENCOUNTER — OUTPATIENT (OUTPATIENT)
Dept: OUTPATIENT SERVICES | Facility: HOSPITAL | Age: 66
LOS: 1 days | End: 2021-08-09

## 2021-08-09 PROCEDURE — 71045 X-RAY EXAM CHEST 1 VIEW: CPT | Mod: 26

## 2021-08-10 ENCOUNTER — OUTPATIENT (OUTPATIENT)
Dept: OUTPATIENT SERVICES | Facility: HOSPITAL | Age: 66
LOS: 1 days | End: 2021-08-10

## 2021-08-10 PROCEDURE — 71046 X-RAY EXAM CHEST 2 VIEWS: CPT | Mod: 26

## 2021-08-11 ENCOUNTER — OUTPATIENT (OUTPATIENT)
Dept: OUTPATIENT SERVICES | Facility: HOSPITAL | Age: 66
LOS: 1 days | End: 2021-08-11

## 2021-08-11 ENCOUNTER — INPATIENT (INPATIENT)
Facility: HOSPITAL | Age: 66
LOS: 20 days | Discharge: HOME | End: 2021-09-01
Attending: INTERNAL MEDICINE | Admitting: INTERNAL MEDICINE
Payer: MEDICARE

## 2021-08-11 PROCEDURE — 71045 X-RAY EXAM CHEST 1 VIEW: CPT | Mod: 26

## 2021-08-16 ENCOUNTER — OUTPATIENT (OUTPATIENT)
Dept: OUTPATIENT SERVICES | Facility: HOSPITAL | Age: 66
LOS: 1 days | End: 2021-08-16

## 2021-08-17 ENCOUNTER — APPOINTMENT (OUTPATIENT)
Dept: CARDIOLOGY | Facility: CLINIC | Age: 66
End: 2021-08-17

## 2021-08-17 PROCEDURE — 93970 EXTREMITY STUDY: CPT | Mod: 26

## 2021-08-18 ENCOUNTER — OUTPATIENT (OUTPATIENT)
Dept: OUTPATIENT SERVICES | Facility: HOSPITAL | Age: 66
LOS: 1 days | End: 2021-08-18

## 2021-08-20 ENCOUNTER — OUTPATIENT (OUTPATIENT)
Dept: OUTPATIENT SERVICES | Facility: HOSPITAL | Age: 66
LOS: 1 days | End: 2021-08-20

## 2021-08-20 PROCEDURE — 71045 X-RAY EXAM CHEST 1 VIEW: CPT | Mod: 26

## 2021-08-24 ENCOUNTER — EMERGENCY (EMERGENCY)
Facility: HOSPITAL | Age: 66
LOS: 1 days | End: 2021-08-24
Admitting: EMERGENCY MEDICINE
Payer: MEDICARE

## 2021-08-24 PROCEDURE — 99284 EMERGENCY DEPT VISIT MOD MDM: CPT | Mod: 25

## 2021-08-24 PROCEDURE — 72125 CT NECK SPINE W/O DYE: CPT | Mod: 26

## 2021-08-24 PROCEDURE — 12001 RPR S/N/AX/GEN/TRNK 2.5CM/<: CPT

## 2021-08-24 PROCEDURE — 70450 CT HEAD/BRAIN W/O DYE: CPT | Mod: 26

## 2021-08-25 ENCOUNTER — OUTPATIENT (OUTPATIENT)
Dept: OUTPATIENT SERVICES | Facility: HOSPITAL | Age: 66
LOS: 1 days | End: 2021-08-25

## 2021-08-25 PROCEDURE — 71046 X-RAY EXAM CHEST 2 VIEWS: CPT | Mod: 26

## 2021-08-27 ENCOUNTER — OUTPATIENT (OUTPATIENT)
Dept: OUTPATIENT SERVICES | Facility: HOSPITAL | Age: 66
LOS: 1 days | End: 2021-08-27

## 2021-08-31 ENCOUNTER — OUTPATIENT (OUTPATIENT)
Dept: OUTPATIENT SERVICES | Facility: HOSPITAL | Age: 66
LOS: 1 days | End: 2021-08-31

## 2021-09-07 ENCOUNTER — INPATIENT (INPATIENT)
Facility: HOSPITAL | Age: 66
LOS: 5 days | Discharge: NURSING, REHAB OR LONG-TERM CARE | End: 2021-09-13
Payer: MEDICARE

## 2021-09-07 ENCOUNTER — OUTPATIENT (OUTPATIENT)
Dept: OUTPATIENT SERVICES | Facility: HOSPITAL | Age: 66
LOS: 1 days | End: 2021-09-07

## 2021-09-07 PROCEDURE — 70450 CT HEAD/BRAIN W/O DYE: CPT | Mod: 26

## 2021-09-07 PROCEDURE — 99285 EMERGENCY DEPT VISIT HI MDM: CPT

## 2021-09-07 PROCEDURE — 71045 X-RAY EXAM CHEST 1 VIEW: CPT | Mod: 26

## 2021-09-07 PROCEDURE — 99254 IP/OBS CNSLTJ NEW/EST MOD 60: CPT

## 2021-09-07 PROCEDURE — 93970 EXTREMITY STUDY: CPT | Mod: 26

## 2021-09-07 PROCEDURE — 93010 ELECTROCARDIOGRAM REPORT: CPT

## 2021-09-08 ENCOUNTER — OUTPATIENT (OUTPATIENT)
Dept: OUTPATIENT SERVICES | Facility: HOSPITAL | Age: 66
LOS: 1 days | End: 2021-09-08

## 2021-09-08 PROCEDURE — 99233 SBSQ HOSP IP/OBS HIGH 50: CPT

## 2021-09-08 PROCEDURE — 93010 ELECTROCARDIOGRAM REPORT: CPT

## 2021-09-09 ENCOUNTER — NON-APPOINTMENT (OUTPATIENT)
Age: 66
End: 2021-09-09

## 2021-09-09 ENCOUNTER — OUTPATIENT (OUTPATIENT)
Dept: OUTPATIENT SERVICES | Facility: HOSPITAL | Age: 66
LOS: 1 days | End: 2021-09-09

## 2021-09-09 PROCEDURE — 71045 X-RAY EXAM CHEST 1 VIEW: CPT | Mod: 26

## 2021-09-10 ENCOUNTER — OUTPATIENT (OUTPATIENT)
Dept: OUTPATIENT SERVICES | Facility: HOSPITAL | Age: 66
LOS: 1 days | End: 2021-09-10

## 2021-09-11 ENCOUNTER — OUTPATIENT (OUTPATIENT)
Dept: OUTPATIENT SERVICES | Facility: HOSPITAL | Age: 66
LOS: 1 days | End: 2021-09-11

## 2021-09-12 ENCOUNTER — OUTPATIENT (OUTPATIENT)
Dept: OUTPATIENT SERVICES | Facility: HOSPITAL | Age: 66
LOS: 1 days | End: 2021-09-12

## 2021-09-13 ENCOUNTER — OUTPATIENT (OUTPATIENT)
Dept: OUTPATIENT SERVICES | Facility: HOSPITAL | Age: 66
LOS: 1 days | End: 2021-09-13

## 2021-09-13 PROCEDURE — 71046 X-RAY EXAM CHEST 2 VIEWS: CPT | Mod: 26

## 2021-09-21 ENCOUNTER — EMERGENCY (EMERGENCY)
Facility: HOSPITAL | Age: 66
LOS: 1 days | End: 2021-09-21
Admitting: EMERGENCY MEDICINE
Payer: MEDICARE

## 2021-09-21 PROCEDURE — 70450 CT HEAD/BRAIN W/O DYE: CPT | Mod: 26

## 2021-09-21 PROCEDURE — 99284 EMERGENCY DEPT VISIT MOD MDM: CPT

## 2021-09-21 PROCEDURE — 72125 CT NECK SPINE W/O DYE: CPT | Mod: 26

## 2022-03-21 ENCOUNTER — EMERGENCY (EMERGENCY)
Facility: HOSPITAL | Age: 67
LOS: 1 days | Discharge: HOME | End: 2022-03-21
Admitting: EMERGENCY MEDICINE
Payer: MEDICARE

## 2022-03-21 PROCEDURE — 99283 EMERGENCY DEPT VISIT LOW MDM: CPT | Mod: FS
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
## 2022-03-22 DIAGNOSIS — F17.200 NICOTINE DEPENDENCE, UNSPECIFIED, UNCOMPLICATED: ICD-10-CM

## 2022-03-22 DIAGNOSIS — I10 ESSENTIAL (PRIMARY) HYPERTENSION: ICD-10-CM
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
## 2022-03-22 DIAGNOSIS — Z04.3 ENCOUNTER FOR EXAMINATION AND OBSERVATION FOLLOWING OTHER ACCIDENT: ICD-10-CM
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
## 2022-03-28 ENCOUNTER — EMERGENCY (EMERGENCY)
Facility: HOSPITAL | Age: 67
LOS: 1 days | Discharge: HOME | End: 2022-03-28
Admitting: EMERGENCY MEDICINE
Payer: MEDICARE

## 2022-03-28 PROCEDURE — 99284 EMERGENCY DEPT VISIT MOD MDM: CPT

## 2022-03-28 PROCEDURE — 73630 X-RAY EXAM OF FOOT: CPT | Mod: 26,RT

## 2022-03-29 DIAGNOSIS — S90.871A: ICD-10-CM
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
## 2022-03-29 DIAGNOSIS — M79.671 PAIN IN RIGHT FOOT: ICD-10-CM

## 2022-04-12 ENCOUNTER — EMERGENCY (EMERGENCY)
Facility: HOSPITAL | Age: 67
LOS: 1 days | Discharge: HOME | End: 2022-04-12
Admitting: EMERGENCY MEDICINE
Payer: MEDICARE
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
## 2022-04-12 DIAGNOSIS — S60.512A ABRASION OF LEFT HAND, INITIAL ENCOUNTER: ICD-10-CM

## 2022-04-12 DIAGNOSIS — M79.642 PAIN IN LEFT HAND: ICD-10-CM
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
## 2022-04-12 PROCEDURE — 99283 EMERGENCY DEPT VISIT LOW MDM: CPT
